# Patient Record
Sex: MALE | Race: WHITE | URBAN - METROPOLITAN AREA
[De-identification: names, ages, dates, MRNs, and addresses within clinical notes are randomized per-mention and may not be internally consistent; named-entity substitution may affect disease eponyms.]

---

## 2019-01-22 ENCOUNTER — OFFICE VISIT (OUTPATIENT)
Dept: PRIMARY CARE CLINIC | Age: 39
End: 2019-01-22
Payer: COMMERCIAL

## 2019-01-22 VITALS
HEART RATE: 103 BPM | HEIGHT: 70 IN | TEMPERATURE: 98.6 F | SYSTOLIC BLOOD PRESSURE: 118 MMHG | WEIGHT: 315 LBS | OXYGEN SATURATION: 98 % | RESPIRATION RATE: 16 BRPM | BODY MASS INDEX: 45.1 KG/M2 | DIASTOLIC BLOOD PRESSURE: 84 MMHG

## 2019-01-22 DIAGNOSIS — D50.8 IRON DEFICIENCY ANEMIA SECONDARY TO INADEQUATE DIETARY IRON INTAKE: ICD-10-CM

## 2019-01-22 DIAGNOSIS — E03.9 HYPOTHYROIDISM (ACQUIRED): ICD-10-CM

## 2019-01-22 DIAGNOSIS — E66.01 MORBID OBESITY WITH BMI OF 50.0-59.9, ADULT (HCC): ICD-10-CM

## 2019-01-22 DIAGNOSIS — E78.5 DYSLIPIDEMIA: ICD-10-CM

## 2019-01-22 DIAGNOSIS — I10 ESSENTIAL HYPERTENSION: ICD-10-CM

## 2019-01-22 DIAGNOSIS — R31.9 HEMATURIA, UNSPECIFIED TYPE: ICD-10-CM

## 2019-01-22 DIAGNOSIS — R35.0 URINARY FREQUENCY: Primary | ICD-10-CM

## 2019-01-22 DIAGNOSIS — R81 GLUCOSE FOUND IN URINE ON EXAMINATION: ICD-10-CM

## 2019-01-22 DIAGNOSIS — R06.02 SOB (SHORTNESS OF BREATH): ICD-10-CM

## 2019-01-22 LAB
BILIRUBIN, POC: ABNORMAL
BLOOD URINE, POC: ABNORMAL
CLARITY, POC: CLEAR
COLOR, POC: YELLOW
GLUCOSE BLD-MCNC: 312 MG/DL
GLUCOSE URINE, POC: ABNORMAL
HBA1C MFR BLD: 9.1 %
KETONES, POC: ABNORMAL
LEUKOCYTE EST, POC: ABNORMAL
NITRITE, POC: ABNORMAL
PH, POC: 6
PROTEIN, POC: ABNORMAL
SPECIFIC GRAVITY, POC: 1.01
UROBILINOGEN, POC: ABNORMAL

## 2019-01-22 PROCEDURE — 99214 OFFICE O/P EST MOD 30 MIN: CPT | Performed by: FAMILY MEDICINE

## 2019-01-22 PROCEDURE — G8417 CALC BMI ABV UP PARAM F/U: HCPCS | Performed by: FAMILY MEDICINE

## 2019-01-22 PROCEDURE — 93000 ELECTROCARDIOGRAM COMPLETE: CPT | Performed by: FAMILY MEDICINE

## 2019-01-22 PROCEDURE — 83036 HEMOGLOBIN GLYCOSYLATED A1C: CPT | Performed by: FAMILY MEDICINE

## 2019-01-22 PROCEDURE — 3046F HEMOGLOBIN A1C LEVEL >9.0%: CPT | Performed by: FAMILY MEDICINE

## 2019-01-22 PROCEDURE — G8427 DOCREV CUR MEDS BY ELIG CLIN: HCPCS | Performed by: FAMILY MEDICINE

## 2019-01-22 PROCEDURE — 1036F TOBACCO NON-USER: CPT | Performed by: FAMILY MEDICINE

## 2019-01-22 PROCEDURE — 82962 GLUCOSE BLOOD TEST: CPT | Performed by: FAMILY MEDICINE

## 2019-01-22 PROCEDURE — G8484 FLU IMMUNIZE NO ADMIN: HCPCS | Performed by: FAMILY MEDICINE

## 2019-01-22 PROCEDURE — 2022F DILAT RTA XM EVC RTNOPTHY: CPT | Performed by: FAMILY MEDICINE

## 2019-01-22 PROCEDURE — 81003 URINALYSIS AUTO W/O SCOPE: CPT | Performed by: FAMILY MEDICINE

## 2019-01-22 RX ORDER — METFORMIN HYDROCHLORIDE 500 MG/1
500 TABLET, EXTENDED RELEASE ORAL
Qty: 60 TABLET | Refills: 2 | Status: SHIPPED | OUTPATIENT
Start: 2019-01-22 | End: 2019-02-04 | Stop reason: SDUPTHER

## 2019-01-22 ASSESSMENT — ENCOUNTER SYMPTOMS
APNEA: 0
SPUTUM PRODUCTION: 0
HEMOPTYSIS: 0
SHORTNESS OF BREATH: 1
ORTHOPNEA: 0
ABDOMINAL PAIN: 0
CONSTIPATION: 0
BLOOD IN STOOL: 0
RHINORRHEA: 0
SORE THROAT: 0
NAUSEA: 0
CHEST TIGHTNESS: 0
SWOLLEN GLANDS: 0
COUGH: 0
WHEEZING: 0
EYES NEGATIVE: 1
BACK PAIN: 0
VOMITING: 0
DIARRHEA: 0
GASTROINTESTINAL NEGATIVE: 1
EYE DISCHARGE: 0
PHOTOPHOBIA: 0

## 2019-01-22 ASSESSMENT — PATIENT HEALTH QUESTIONNAIRE - PHQ9
SUM OF ALL RESPONSES TO PHQ QUESTIONS 1-9: 0
SUM OF ALL RESPONSES TO PHQ QUESTIONS 1-9: 0
1. LITTLE INTEREST OR PLEASURE IN DOING THINGS: 0
SUM OF ALL RESPONSES TO PHQ9 QUESTIONS 1 & 2: 0
2. FEELING DOWN, DEPRESSED OR HOPELESS: 0

## 2019-01-24 LAB
ORGANISM: ABNORMAL
URINE CULTURE, ROUTINE: ABNORMAL
URINE CULTURE, ROUTINE: ABNORMAL

## 2019-01-26 DIAGNOSIS — E78.5 DYSLIPIDEMIA: ICD-10-CM

## 2019-01-26 DIAGNOSIS — E03.9 HYPOTHYROIDISM (ACQUIRED): ICD-10-CM

## 2019-01-26 DIAGNOSIS — D50.8 IRON DEFICIENCY ANEMIA SECONDARY TO INADEQUATE DIETARY IRON INTAKE: ICD-10-CM

## 2019-01-26 LAB
ALBUMIN SERPL-MCNC: 4.1 G/DL (ref 3.9–4.9)
ALP BLD-CCNC: 119 U/L (ref 35–104)
ALT SERPL-CCNC: 119 U/L (ref 0–41)
ANION GAP SERPL CALCULATED.3IONS-SCNC: 16 MEQ/L (ref 7–13)
AST SERPL-CCNC: 106 U/L (ref 0–40)
BASOPHILS ABSOLUTE: 0 K/UL (ref 0–0.2)
BASOPHILS RELATIVE PERCENT: 0.7 %
BILIRUB SERPL-MCNC: 0.7 MG/DL (ref 0–1.2)
BUN BLDV-MCNC: 8 MG/DL (ref 6–20)
CALCIUM SERPL-MCNC: 9.3 MG/DL (ref 8.6–10.2)
CHLORIDE BLD-SCNC: 101 MEQ/L (ref 98–107)
CHOLESTEROL, TOTAL: 205 MG/DL (ref 0–199)
CO2: 23 MEQ/L (ref 22–29)
CREAT SERPL-MCNC: 0.79 MG/DL (ref 0.7–1.2)
EOSINOPHILS ABSOLUTE: 0.1 K/UL (ref 0–0.7)
EOSINOPHILS RELATIVE PERCENT: 1.5 %
GFR AFRICAN AMERICAN: >60
GFR NON-AFRICAN AMERICAN: >60
GLOBULIN: 3.4 G/DL (ref 2.3–3.5)
GLUCOSE BLD-MCNC: 260 MG/DL (ref 74–109)
HCT VFR BLD CALC: 47.5 % (ref 42–52)
HDLC SERPL-MCNC: 22 MG/DL (ref 40–59)
HEMOGLOBIN: 17 G/DL (ref 14–18)
LDL CHOLESTEROL CALCULATED: ABNORMAL MG/DL (ref 0–129)
LYMPHOCYTES ABSOLUTE: 1.7 K/UL (ref 1–4.8)
LYMPHOCYTES RELATIVE PERCENT: 31.9 %
MCH RBC QN AUTO: 30.6 PG (ref 27–31.3)
MCHC RBC AUTO-ENTMCNC: 35.8 % (ref 33–37)
MCV RBC AUTO: 85.6 FL (ref 80–100)
MONOCYTES ABSOLUTE: 0.4 K/UL (ref 0.2–0.8)
MONOCYTES RELATIVE PERCENT: 7.5 %
NEUTROPHILS ABSOLUTE: 3.2 K/UL (ref 1.4–6.5)
NEUTROPHILS RELATIVE PERCENT: 58.4 %
PDW BLD-RTO: 13.9 % (ref 11.5–14.5)
PLATELET # BLD: 165 K/UL (ref 130–400)
POTASSIUM SERPL-SCNC: 4.4 MEQ/L (ref 3.5–5.1)
RBC # BLD: 5.55 M/UL (ref 4.7–6.1)
SODIUM BLD-SCNC: 140 MEQ/L (ref 132–144)
TOTAL PROTEIN: 7.5 G/DL (ref 6.4–8.1)
TRIGL SERPL-MCNC: 1180 MG/DL (ref 0–200)
TSH SERPL DL<=0.05 MIU/L-ACNC: 2.87 UIU/ML (ref 0.27–4.2)
WBC # BLD: 5.5 K/UL (ref 4.8–10.8)

## 2019-01-31 DIAGNOSIS — E11.8 TYPE 2 DIABETES MELLITUS WITH COMPLICATION, WITHOUT LONG-TERM CURRENT USE OF INSULIN (HCC): Primary | ICD-10-CM

## 2019-02-04 ENCOUNTER — HOSPITAL ENCOUNTER (OUTPATIENT)
Dept: DIABETES SERVICES | Age: 39
Setting detail: THERAPIES SERIES
Discharge: HOME | End: 2019-02-04
Payer: COMMERCIAL

## 2019-02-04 ENCOUNTER — OFFICE VISIT (OUTPATIENT)
Dept: PRIMARY CARE CLINIC | Age: 39
End: 2019-02-04
Payer: COMMERCIAL

## 2019-02-04 VITALS
HEART RATE: 80 BPM | DIASTOLIC BLOOD PRESSURE: 80 MMHG | HEIGHT: 70 IN | RESPIRATION RATE: 16 BRPM | OXYGEN SATURATION: 97 % | WEIGHT: 315 LBS | SYSTOLIC BLOOD PRESSURE: 122 MMHG | BODY MASS INDEX: 45.1 KG/M2 | TEMPERATURE: 98.4 F

## 2019-02-04 VITALS — WEIGHT: 315 LBS | BODY MASS INDEX: 45.1 KG/M2 | HEIGHT: 70 IN

## 2019-02-04 DIAGNOSIS — Z80.0 FH: COLON CANCER: ICD-10-CM

## 2019-02-04 DIAGNOSIS — E66.01 MORBID OBESITY WITH BMI OF 50.0-59.9, ADULT (HCC): ICD-10-CM

## 2019-02-04 DIAGNOSIS — E78.1 HYPERTRIGLYCERIDEMIA: ICD-10-CM

## 2019-02-04 PROCEDURE — 3046F HEMOGLOBIN A1C LEVEL >9.0%: CPT | Performed by: FAMILY MEDICINE

## 2019-02-04 PROCEDURE — 1036F TOBACCO NON-USER: CPT | Performed by: FAMILY MEDICINE

## 2019-02-04 PROCEDURE — 99214 OFFICE O/P EST MOD 30 MIN: CPT | Performed by: FAMILY MEDICINE

## 2019-02-04 PROCEDURE — 2022F DILAT RTA XM EVC RTNOPTHY: CPT | Performed by: FAMILY MEDICINE

## 2019-02-04 PROCEDURE — G0108 DIAB MANAGE TRN  PER INDIV: HCPCS

## 2019-02-04 PROCEDURE — G8417 CALC BMI ABV UP PARAM F/U: HCPCS | Performed by: FAMILY MEDICINE

## 2019-02-04 PROCEDURE — G8484 FLU IMMUNIZE NO ADMIN: HCPCS | Performed by: FAMILY MEDICINE

## 2019-02-04 PROCEDURE — G8427 DOCREV CUR MEDS BY ELIG CLIN: HCPCS | Performed by: FAMILY MEDICINE

## 2019-02-04 RX ORDER — ICOSAPENT ETHYL 1000 MG/1
2 CAPSULE ORAL 2 TIMES DAILY
Qty: 120 CAPSULE | Refills: 3 | Status: SHIPPED | OUTPATIENT
Start: 2019-02-04 | End: 2019-03-04

## 2019-02-04 RX ORDER — METFORMIN HYDROCHLORIDE 500 MG/1
500 TABLET, EXTENDED RELEASE ORAL
Qty: 180 TABLET | Refills: 0 | Status: CANCELLED | OUTPATIENT
Start: 2019-02-04 | End: 2019-05-05

## 2019-02-04 RX ORDER — METFORMIN HYDROCHLORIDE 500 MG/1
500 TABLET, EXTENDED RELEASE ORAL
Qty: 180 TABLET | Refills: 1 | Status: SHIPPED | OUTPATIENT
Start: 2019-02-04

## 2019-02-04 ASSESSMENT — ENCOUNTER SYMPTOMS
EYE DISCHARGE: 0
RESPIRATORY NEGATIVE: 1
CONSTIPATION: 0
SHORTNESS OF BREATH: 0
PHOTOPHOBIA: 0
EYES NEGATIVE: 1
ABDOMINAL PAIN: 0
BACK PAIN: 0
BLOOD IN STOOL: 0
VOMITING: 0
COUGH: 0
APNEA: 0
NAUSEA: 0
ABDOMINAL DISTENTION: 1
DIARRHEA: 0
CHEST TIGHTNESS: 0

## 2019-02-04 ASSESSMENT — PROBLEM AREAS IN DIABETES QUESTIONNAIRE (PAID)
COPING WITH COMPLICATIONS OF DIABETES: 0
WORRYING ABOUT THE FUTURE AND THE POSSIBILITY OF SERIOUS COMPLICATIONS: 3
FEELING SCARED WHEN YOU THINK ABOUT LIVING WITH DIABETES: 0
FEELING DEPRESSED WHEN YOU THINK ABOUT LIVING WITH DIABETES: 0
PAID-5 TOTAL SCORE: 3
FEELING THAT DIABETES IS TAKING UP TOO MUCH OF YOUR MENTAL AND PHYSICAL ENERGY EVERY DAY: 0

## 2019-02-04 ASSESSMENT — SLEEP AND FATIGUE QUESTIONNAIRES
HOW DO YOU RATE THE QUALITY OF YOUR SLEEP: FAIR
HAVE YOU BEEN TOLD, OR NOTICED ON YOUR OWN, THAT YOU STOP BREATHING OR STRUGGLE TO BREATHE IN YOUR SLEEP: YES
HAVE YOU EVER BEEN TESTED FOR SLEEP APNEA: YES
HOW MANY HOURS OF SLEEP ARE YOU GETTING, ON AVERAGE: 7 OR MORE

## 2019-02-05 ENCOUNTER — HOSPITAL ENCOUNTER (OUTPATIENT)
Dept: DIABETES SERVICES | Age: 39
Setting detail: THERAPIES SERIES
Discharge: HOME | End: 2019-02-05
Payer: COMMERCIAL

## 2019-02-05 ENCOUNTER — TELEPHONE (OUTPATIENT)
Dept: PRIMARY CARE CLINIC | Age: 39
End: 2019-02-05

## 2019-02-05 PROCEDURE — G0109 DIAB MANAGE TRN IND/GROUP: HCPCS

## 2019-02-05 RX ORDER — OMEGA-3-ACID ETHYL ESTERS 1 G/1
2 CAPSULE, LIQUID FILLED ORAL 2 TIMES DAILY
Qty: 120 CAPSULE | Refills: 5 | Status: SHIPPED | OUTPATIENT
Start: 2019-02-05

## 2019-02-06 ENCOUNTER — HOSPITAL ENCOUNTER (OUTPATIENT)
Dept: DIABETES SERVICES | Age: 39
Setting detail: THERAPIES SERIES
Discharge: HOME | End: 2019-02-06
Payer: COMMERCIAL

## 2019-02-06 PROCEDURE — G0109 DIAB MANAGE TRN IND/GROUP: HCPCS

## 2019-02-07 ENCOUNTER — HOSPITAL ENCOUNTER (OUTPATIENT)
Dept: DIABETES SERVICES | Age: 39
Setting detail: THERAPIES SERIES
Discharge: HOME | End: 2019-02-07
Payer: COMMERCIAL

## 2019-02-07 PROCEDURE — G0109 DIAB MANAGE TRN IND/GROUP: HCPCS

## 2019-02-11 ENCOUNTER — TELEPHONE (OUTPATIENT)
Dept: PRIMARY CARE CLINIC | Age: 39
End: 2019-02-11

## 2019-02-13 DIAGNOSIS — E08.21 DIABETES MELLITUS DUE TO UNDERLYING CONDITION WITH DIABETIC NEPHROPATHY, UNSPECIFIED WHETHER LONG TERM INSULIN USE (HCC): Primary | ICD-10-CM

## 2019-02-13 DIAGNOSIS — E66.01 MORBID OBESITY WITH BMI OF 50.0-59.9, ADULT (HCC): ICD-10-CM

## 2019-03-04 ENCOUNTER — OFFICE VISIT (OUTPATIENT)
Dept: PRIMARY CARE CLINIC | Age: 39
End: 2019-03-04
Payer: COMMERCIAL

## 2019-03-04 VITALS
HEIGHT: 70 IN | DIASTOLIC BLOOD PRESSURE: 82 MMHG | SYSTOLIC BLOOD PRESSURE: 124 MMHG | TEMPERATURE: 97.7 F | OXYGEN SATURATION: 97 % | HEART RATE: 74 BPM | RESPIRATION RATE: 14 BRPM | WEIGHT: 315 LBS | BODY MASS INDEX: 45.1 KG/M2

## 2019-03-04 DIAGNOSIS — I10 ESSENTIAL HYPERTENSION: ICD-10-CM

## 2019-03-04 DIAGNOSIS — E78.5 DYSLIPIDEMIA: ICD-10-CM

## 2019-03-04 DIAGNOSIS — E08.00 DIABETES MELLITUS DUE TO UNDERLYING CONDITION WITH HYPEROSMOLARITY WITHOUT COMA, WITHOUT LONG-TERM CURRENT USE OF INSULIN (HCC): Primary | ICD-10-CM

## 2019-03-04 DIAGNOSIS — R79.89 ABNORMAL LFTS: ICD-10-CM

## 2019-03-04 DIAGNOSIS — M79.605 LEG PAIN, DIFFUSE, LEFT: ICD-10-CM

## 2019-03-04 LAB
GLUCOSE BLD-MCNC: 94 MG/DL
HBA1C MFR BLD: 7.2 %

## 2019-03-04 PROCEDURE — G8417 CALC BMI ABV UP PARAM F/U: HCPCS | Performed by: FAMILY MEDICINE

## 2019-03-04 PROCEDURE — 99214 OFFICE O/P EST MOD 30 MIN: CPT | Performed by: FAMILY MEDICINE

## 2019-03-04 PROCEDURE — G8427 DOCREV CUR MEDS BY ELIG CLIN: HCPCS | Performed by: FAMILY MEDICINE

## 2019-03-04 PROCEDURE — G8484 FLU IMMUNIZE NO ADMIN: HCPCS | Performed by: FAMILY MEDICINE

## 2019-03-04 PROCEDURE — 1036F TOBACCO NON-USER: CPT | Performed by: FAMILY MEDICINE

## 2019-03-04 PROCEDURE — 83036 HEMOGLOBIN GLYCOSYLATED A1C: CPT | Performed by: FAMILY MEDICINE

## 2019-03-04 PROCEDURE — 82962 GLUCOSE BLOOD TEST: CPT | Performed by: FAMILY MEDICINE

## 2019-03-04 RX ORDER — LORATADINE 10 MG/1
10 TABLET ORAL
COMMUNITY
Start: 2018-05-10

## 2019-03-05 DIAGNOSIS — E08.00 DIABETES MELLITUS DUE TO UNDERLYING CONDITION WITH HYPEROSMOLARITY WITHOUT COMA, WITHOUT LONG-TERM CURRENT USE OF INSULIN (HCC): ICD-10-CM

## 2019-03-05 LAB
CREATININE URINE: 163.1 MG/DL
MICROALBUMIN UR-MCNC: 2.3 MG/DL
MICROALBUMIN/CREAT UR-RTO: 14.1 MG/G (ref 0–30)

## 2019-03-17 ASSESSMENT — ENCOUNTER SYMPTOMS
BLOOD IN STOOL: 0
SHORTNESS OF BREATH: 0
EYE DISCHARGE: 0
EYES NEGATIVE: 1
RESPIRATORY NEGATIVE: 1
VOMITING: 0
CHEST TIGHTNESS: 0
NAUSEA: 0
BACK PAIN: 0
GASTROINTESTINAL NEGATIVE: 1
DIARRHEA: 0
COUGH: 0
ABDOMINAL PAIN: 0
APNEA: 0
PHOTOPHOBIA: 0
CONSTIPATION: 0

## 2019-03-23 ENCOUNTER — OFFICE VISIT (OUTPATIENT)
Dept: FAMILY MEDICINE CLINIC | Age: 39
End: 2019-03-23
Payer: COMMERCIAL

## 2019-03-23 VITALS
BODY MASS INDEX: 54.5 KG/M2 | OXYGEN SATURATION: 95 % | WEIGHT: 315 LBS | RESPIRATION RATE: 18 BRPM | HEART RATE: 90 BPM | TEMPERATURE: 98.7 F | DIASTOLIC BLOOD PRESSURE: 70 MMHG | SYSTOLIC BLOOD PRESSURE: 124 MMHG

## 2019-03-23 DIAGNOSIS — H66.002 ACUTE SUPPURATIVE OTITIS MEDIA OF LEFT EAR WITHOUT SPONTANEOUS RUPTURE OF TYMPANIC MEMBRANE, RECURRENCE NOT SPECIFIED: ICD-10-CM

## 2019-03-23 DIAGNOSIS — J45.901 MILD ASTHMA WITH ACUTE EXACERBATION, UNSPECIFIED WHETHER PERSISTENT: Primary | ICD-10-CM

## 2019-03-23 PROCEDURE — 1036F TOBACCO NON-USER: CPT | Performed by: NURSE PRACTITIONER

## 2019-03-23 PROCEDURE — G8427 DOCREV CUR MEDS BY ELIG CLIN: HCPCS | Performed by: NURSE PRACTITIONER

## 2019-03-23 PROCEDURE — G8417 CALC BMI ABV UP PARAM F/U: HCPCS | Performed by: NURSE PRACTITIONER

## 2019-03-23 PROCEDURE — 99214 OFFICE O/P EST MOD 30 MIN: CPT | Performed by: NURSE PRACTITIONER

## 2019-03-23 PROCEDURE — G8484 FLU IMMUNIZE NO ADMIN: HCPCS | Performed by: NURSE PRACTITIONER

## 2019-03-23 RX ORDER — ALBUTEROL SULFATE 90 UG/1
2 AEROSOL, METERED RESPIRATORY (INHALATION) EVERY 4 HOURS PRN
Qty: 1 INHALER | Refills: 3 | Status: SHIPPED | OUTPATIENT
Start: 2019-03-23

## 2019-03-23 RX ORDER — CEFUROXIME AXETIL 500 MG/1
500 TABLET ORAL 2 TIMES DAILY
Qty: 20 TABLET | Refills: 0 | Status: SHIPPED | OUTPATIENT
Start: 2019-03-23 | End: 2019-04-02

## 2019-03-23 ASSESSMENT — ENCOUNTER SYMPTOMS
SHORTNESS OF BREATH: 1
CHEST TIGHTNESS: 0
TROUBLE SWALLOWING: 0
EYE ITCHING: 0
COUGH: 1
SINUS PAIN: 1
EYE PAIN: 0
VOMITING: 0
NAUSEA: 0
EYE DISCHARGE: 0
EYE REDNESS: 0
CONSTIPATION: 0
SORE THROAT: 1
SINUS PRESSURE: 1
WHEEZING: 0
RHINORRHEA: 1
DIARRHEA: 0

## 2019-05-07 ENCOUNTER — TELEPHONE (OUTPATIENT)
Dept: PRIMARY CARE CLINIC | Age: 39
End: 2019-05-07

## 2019-06-07 ENCOUNTER — TELEPHONE (OUTPATIENT)
Dept: DIABETES SERVICES | Age: 39
End: 2019-06-07

## 2019-06-26 ENCOUNTER — TELEPHONE (OUTPATIENT)
Dept: DIABETES SERVICES | Age: 39
End: 2019-06-26

## 2019-08-01 ENCOUNTER — TELEPHONE (OUTPATIENT)
Dept: DIABETES SERVICES | Age: 39
End: 2019-08-01

## 2019-08-01 NOTE — PROGRESS NOTES
carbohydrates. Zhao Cox RN   02/06/2019 Alexi Huerta RD, LD    What to eat - Food groups, When to eat - timing of meals and snacks, and How much to eat - portions control. 1800 calories/ day   4 CHO choices/ meal   14 CHO choices/  day   grams of protein /day   gram of fat /day     Correctly read food labels & demonstrate CHO counting & portion control with personalized meal plan. Identify dining out strategies, & dietary sick day guidelines. (1) 02/04/19 Zhao Cox RN   02/06/2019 Alexi Huerta RD, LD   (3) 08/01/19 Zhao Cox RN  02/04/19  Importance of label reading reviewed with patient including focus on total carbohydrates and not just sugar content as well as serving size. Zhao Cox RN     Instructed on CHO counting, CHO containing foods, & balanced healthy eating  02/06/2019 Alexi Huerta RD, LD       Incorporating physical activity into lifestyle:   Verbalize effect of exercise on blood glucose levels; benefits of regular exercise; safety considerations; contraindications; maintenance of activity. (1) 02/04/19 Zhao Cox RN  02/05/19 Zhao Cox RN   (3) 08/01/19 Zhao Cox RN  02/04/19 Reports being inactive, but would like to start going to the gym soon. Zhao Cox RN     02/05/19 - Reviewed with the group the importance of exercise, recommendations by the ADA, effects on BG, weight loss and precautions. Discussed what patient is doing to stay active with the group. Zhao Cox RN       Using medications safely:  Identify effects of diabetes medicines on blood glucose levels; List diabetes medication taken, action & side effects;    (1) 02/04/19 Zhao Cox RN  02/07/19  Dar Claudio RN    (3) 08/01/19 Zhao Cox RN  02/04/19 Taking metformin for BG. Having some diah in the evening.  Zhao Cox RN   02/07/19 - Discussed all medication classes with group and modes of action including insulin and

## 2020-08-06 ENCOUNTER — NURSE TRIAGE (OUTPATIENT)
Dept: OTHER | Facility: CLINIC | Age: 40
End: 2020-08-06

## 2020-08-06 NOTE — TELEPHONE ENCOUNTER
Reason for Disposition   SEVERE diarrhea (e.g., 7 or more times / day more than normal) and present > 24 hours (1 day)    Answer Assessment - Initial Assessment Questions  1. DIARRHEA SEVERITY: \"How bad is the diarrhea? \" \"How many extra stools have you had in the past 24 hours than normal?\"     - NO DIARRHEA (SCALE 0)    - MILD (SCALE 1-3): Few loose or mushy BMs; increase of 1-3 stools over normal daily number of stools; mild increase in ostomy output. -  MODERATE (SCALE 4-7): Increase of 4-6 stools daily over normal; moderate increase in ostomy output. * SEVERE (SCALE 8-10; OR 'WORST POSSIBLE'): Increase of 7 or more stools daily over normal; moderate increase in ostomy output; incontinence. severe  2. ONSET: \"When did the diarrhea begin? \"       Four days ago  3. BM CONSISTENCY: \"How loose or watery is the diarrhea? \"       watery  4. VOMITING: \"Are you also vomiting? \" If so, ask: \"How many times in the past 24 hours? \"       Yes just on day two and day three  5. ABDOMINAL PAIN: Friddie Berliner you having any abdominal pain? \" If yes: \"What does it feel like? \" (e.g., crampy, dull, intermittent, constant)       Yes before using the bathroom cramping  6. ABDOMINAL PAIN SEVERITY: If present, ask: \"How bad is the pain? \"  (e.g., Scale 1-10; mild, moderate, or severe)    - MILD (1-3): doesn't interfere with normal activities, abdomen soft and not tender to touch     - MODERATE (4-7): interferes with normal activities or awakens from sleep, tender to touch     - SEVERE (8-10): excruciating pain, doubled over, unable to do any normal activities        moderate  7. ORAL INTAKE: If vomiting, \"Have you been able to drink liquids? \" \"How much fluids have you had in the past 24 hours? \"      Yes quite a bit   8. HYDRATION: \"Any signs of dehydration? \" (e.g., dry mouth [not just dry lips], too weak to stand, dizziness, new weight loss) \"When did you last urinate? \"      No  9.  EXPOSURE: \"Have you traveled to a foreign country recently? \" \"Have you been exposed to anyone with diarrhea? \" \"Could you have eaten any food that was spoiled? \"      No   10. ANTIBIOTIC USE: \"Are you taking antibiotics now or have you taken antibiotics in the past 2 months? \"        no  11. OTHER SYMPTOMS: \"Do you have any other symptoms? \" (e.g., fever, blood in stool)        no  12. PREGNANCY: \"Is there any chance you are pregnant? \" \"When was your last menstrual period? \"        n/a    Protocols used: [id number]-[id number]

## 2022-10-31 ENCOUNTER — NURSE TRIAGE (OUTPATIENT)
Dept: OTHER | Facility: CLINIC | Age: 42
End: 2022-10-31

## 2022-10-31 NOTE — TELEPHONE ENCOUNTER
Location of patient: [place]    Subjective: Caller states \"sciatica pain\"     Current Symptoms: pain from lower back that is radiating down left leg. Last 4 days has been the worst.  Spent the weekend laying down with heating pad on back. This morning able to go to work but now back home due to pain  Pain will worsen with walking or standing  A little nausea  Sporadic dizziness. Onset: 2 weeks ago; gradual    Associated Symptoms: NA    Pain Severity: 3-4/10; ; constant    Temperature:      What has been tried: heating pad, ibuprofen    LMP: NA Pregnant: NA    Recommended disposition: See in Office Today    Care advice provided, patient verbalizes understanding; denies any other questions or concerns; instructed to call back for any new or worsening symptoms. Provided caller with info on     This triage is a result of a call to [address] [place] [address]. Please do not respond to the triage nurse through this encounter. Any subsequent communication should be directly with the patient.     Reason for Disposition   Pain radiates into the thigh or further down the leg, and in both legs    Protocols used: Back Pain-ADULT-[place]

## 2023-07-25 ENCOUNTER — TELEPHONE (OUTPATIENT)
Dept: PRIMARY CARE | Facility: CLINIC | Age: 43
End: 2023-07-25
Payer: COMMERCIAL

## 2023-07-25 NOTE — TELEPHONE ENCOUNTER
YANNICK FROM  [facility]. [phone]    CALLED IN REQUESTING A C/B TO THE PT PERTAINING TO PT BLOOD GLUCOSE THAT KEEPS RISING HIGH AND DROPPING LOW FREQUENTLY.

## 2023-08-15 ENCOUNTER — OFFICE VISIT (OUTPATIENT)
Dept: PRIMARY CARE | Facility: CLINIC | Age: 43
End: 2023-08-15
Payer: COMMERCIAL

## 2023-08-15 VITALS
WEIGHT: 315 LBS | RESPIRATION RATE: 15 BRPM | OXYGEN SATURATION: 96 % | DIASTOLIC BLOOD PRESSURE: 80 MMHG | HEIGHT: 70 IN | SYSTOLIC BLOOD PRESSURE: 128 MMHG | BODY MASS INDEX: 45.1 KG/M2 | HEART RATE: 89 BPM

## 2023-08-15 DIAGNOSIS — E78.5 DYSLIPIDEMIA: ICD-10-CM

## 2023-08-15 DIAGNOSIS — R73.9 HYPERGLYCEMIA: ICD-10-CM

## 2023-08-15 DIAGNOSIS — E11.65 TYPE 2 DIABETES MELLITUS WITH HYPERGLYCEMIA, WITHOUT LONG-TERM CURRENT USE OF INSULIN (MULTI): Primary | ICD-10-CM

## 2023-08-15 DIAGNOSIS — E29.1 TESTICULAR HYPOFUNCTION: ICD-10-CM

## 2023-08-15 DIAGNOSIS — E66.01 MORBID OBESITY (MULTI): ICD-10-CM

## 2023-08-15 DIAGNOSIS — E55.9 VITAMIN D DEFICIENCY: ICD-10-CM

## 2023-08-15 PROBLEM — E78.1 HYPERTRIGLYCERIDEMIA: Status: ACTIVE | Noted: 2019-02-04

## 2023-08-15 PROBLEM — N52.9 ERECTILE DYSFUNCTION: Status: ACTIVE | Noted: 2023-08-15

## 2023-08-15 PROBLEM — F32.A DEPRESSION, ACUTE: Status: ACTIVE | Noted: 2023-08-15

## 2023-08-15 PROBLEM — J45.901 MILD ASTHMA WITH ACUTE EXACERBATION (HHS-HCC): Status: ACTIVE | Noted: 2019-03-23

## 2023-08-15 LAB
POC FINGERSTICK BLOOD GLUCOSE: 284 MG/DL (ref 70–100)
POC HEMOGLOBIN A1C: 11.3 % (ref 4.2–6.5)

## 2023-08-15 PROCEDURE — 99214 OFFICE O/P EST MOD 30 MIN: CPT | Performed by: FAMILY MEDICINE

## 2023-08-15 PROCEDURE — 82962 GLUCOSE BLOOD TEST: CPT | Performed by: FAMILY MEDICINE

## 2023-08-15 PROCEDURE — 83036 HEMOGLOBIN GLYCOSYLATED A1C: CPT | Performed by: FAMILY MEDICINE

## 2023-08-15 RX ORDER — METFORMIN HYDROCHLORIDE 500 MG/1
500 TABLET, EXTENDED RELEASE ORAL
COMMUNITY
Start: 2019-02-04 | End: 2023-08-15 | Stop reason: SDUPTHER

## 2023-08-15 RX ORDER — METFORMIN HYDROCHLORIDE 500 MG/1
500 TABLET, EXTENDED RELEASE ORAL
Qty: 90 TABLET | Refills: 2 | Status: SHIPPED | OUTPATIENT
Start: 2023-08-15 | End: 2023-08-31

## 2023-08-15 ASSESSMENT — ENCOUNTER SYMPTOMS
EYE PAIN: 0
DIARRHEA: 0
ADENOPATHY: 0
FATIGUE: 1
APPETITE CHANGE: 0
CONSTIPATION: 0
SINUS PAIN: 0
BLURRED VISION: 0
POLYPHAGIA: 0
MYALGIAS: 0
SORE THROAT: 0
TREMORS: 0
TROUBLE SWALLOWING: 0
SINUS PRESSURE: 0
SEIZURES: 0
NERVOUS/ANXIOUS: 0
FLANK PAIN: 0
CONFUSION: 0
CHEST TIGHTNESS: 0
ABDOMINAL PAIN: 0
DIZZINESS: 0
WEIGHT LOSS: 0
HEADACHES: 1
DYSURIA: 0
ARTHRALGIAS: 0
SHORTNESS OF BREATH: 0
FEVER: 0
ACTIVITY CHANGE: 0
PALPITATIONS: 0
SPEECH DIFFICULTY: 1
HUNGER: 1
DYSPHORIC MOOD: 0
WEAKNESS: 0
RECTAL PAIN: 0
NECK STIFFNESS: 0
DECREASED CONCENTRATION: 0
COUGH: 0
SLEEP DISTURBANCE: 0
ABDOMINAL DISTENTION: 0
VISUAL CHANGE: 1
PHOTOPHOBIA: 0
COLOR CHANGE: 0
HEMATURIA: 0
RHINORRHEA: 0
STRIDOR: 0
AGITATION: 0
BLOOD IN STOOL: 0
BLACKOUTS: 0
SWEATS: 0
POLYDIPSIA: 0

## 2023-08-15 NOTE — PROGRESS NOTES
Subjective   Patient ID: Taryn Ro is a 43 y.o. male who presents for Blood Sugar Problem and Urinary Frequency.    Diabetes  He has type 2 diabetes mellitus. No MedicAlert identification noted. The initial diagnosis of diabetes was made 5 years ago. Hypoglycemia symptoms include headaches, hunger, sleepiness and speech difficulty. Pertinent negatives for hypoglycemia include no confusion, dizziness, mood changes, nervousness/anxiousness, pallor, seizures, sweats or tremors. Associated symptoms include fatigue, polyuria and visual change. Pertinent negatives for diabetes include no blurred vision, no chest pain, no foot paresthesias, no foot ulcerations, no polydipsia, no polyphagia, no weakness and no weight loss. Pertinent negatives for hypoglycemia complications include no blackouts, no hospitalization, no nocturnal hypoglycemia, no required assistance and no required glucagon injection. Symptoms are stable. Diabetic complications include impotence and retinopathy. Pertinent negatives for diabetic complications include no CVA, heart disease, nephropathy, peripheral neuropathy or PVD. Risk factors for coronary artery disease include family history and obesity. Current diabetic treatment includes diet. He is compliant with treatment most of the time. His weight is stable. He is following a diabetic and generally healthy diet. Meal planning includes avoidance of concentrated sweets and carbohydrate counting. He has not had a previous visit with a dietitian. He participates in exercise weekly. He monitors blood glucose at home 3-4 x per week. He monitors urine at home <1 x per month. Blood glucose monitoring compliance is adequate. His home blood glucose trend is increasing rapidly. His breakfast blood glucose is taken between 5-6 am. His breakfast blood glucose range is generally >200 mg/dl. His lunch blood glucose is taken between 12-1 pm. His lunch blood glucose range is generally >200 mg/dl. His dinner blood  glucose is taken between 5-6 pm. His dinner blood glucose range is generally >200 mg/dl. His overall blood glucose range is >200 mg/dl. He sees a podiatrist.Eye exam is not current.        Patient is here because he is having problems with blood glucose. Pt blood glucose are been higher x a month. Pt is monitoring is blood glucose at home and it range 270-350. Pt would like to discuss is medication.    Pt is having frequency urination x 3 months.     Pt states having fatigue and blurring vision on both eyes. Pt is planing to get appointment with his eye doctor.    Review of Systems   Constitutional:  Positive for fatigue. Negative for activity change, appetite change, fever and weight loss.   HENT:  Negative for congestion, dental problem, ear discharge, ear pain, mouth sores, rhinorrhea, sinus pressure, sinus pain, sore throat, tinnitus and trouble swallowing.    Eyes:  Negative for blurred vision, photophobia, pain and visual disturbance.   Respiratory:  Negative for cough, chest tightness, shortness of breath and stridor.    Cardiovascular:  Negative for chest pain and palpitations.   Gastrointestinal:  Negative for abdominal distention, abdominal pain, blood in stool, constipation, diarrhea and rectal pain.   Endocrine: Positive for polyuria. Negative for cold intolerance, heat intolerance, polydipsia and polyphagia.   Genitourinary:  Positive for impotence. Negative for dysuria, flank pain, hematuria and urgency.   Musculoskeletal:  Negative for arthralgias, gait problem, myalgias and neck stiffness.   Skin:  Negative for color change, pallor and rash.   Allergic/Immunologic: Negative for environmental allergies and food allergies.   Neurological:  Positive for speech difficulty and headaches. Negative for dizziness, tremors, seizures, syncope and weakness.   Hematological:  Negative for adenopathy.   Psychiatric/Behavioral:  Negative for agitation, confusion, decreased concentration, dysphoric mood and sleep  "disturbance. The patient is not nervous/anxious.        Objective   /80 (BP Location: Right arm, Patient Position: Sitting, BP Cuff Size: Large adult)   Pulse 89   Resp 15   Ht 1.778 m (5' 10\")   Wt (!) 166 kg (365 lb 6.4 oz)   SpO2 96%   BMI 52.43 kg/m²     Physical Exam  Vitals reviewed.   Constitutional:       General: He is not in acute distress.     Appearance: He is obese. He is not ill-appearing or diaphoretic.   HENT:      Head: Normocephalic.      Right Ear: Tympanic membrane and external ear normal.      Left Ear: Tympanic membrane and external ear normal.      Nose: Nose normal. No congestion.      Mouth/Throat:      Pharynx: No posterior oropharyngeal erythema.   Eyes:      General:         Right eye: No discharge.         Left eye: No discharge.      Extraocular Movements: Extraocular movements intact.      Conjunctiva/sclera: Conjunctivae normal.      Pupils: Pupils are equal, round, and reactive to light.   Cardiovascular:      Rate and Rhythm: Normal rate and regular rhythm.      Pulses: Normal pulses.      Heart sounds: Normal heart sounds. No murmur heard.  Pulmonary:      Effort: Pulmonary effort is normal. No respiratory distress.      Breath sounds: Normal breath sounds. No wheezing or rales.   Chest:      Chest wall: No tenderness.   Abdominal:      General: Abdomen is flat. Bowel sounds are normal. There is distension.      Palpations: There is no mass.      Tenderness: There is no abdominal tenderness. There is no guarding.   Musculoskeletal:         General: No tenderness. Normal range of motion.      Cervical back: Normal range of motion and neck supple. No tenderness.      Right lower leg: No edema.      Left lower leg: No edema.   Skin:     General: Skin is warm and dry.      Coloration: Skin is not jaundiced.      Findings: No bruising or erythema.   Neurological:      General: No focal deficit present.      Mental Status: He is alert and oriented to person, place, and time. " Mental status is at baseline.      Cranial Nerves: No cranial nerve deficit.      Sensory: No sensory deficit.      Coordination: Coordination normal.      Gait: Gait normal.   Psychiatric:         Mood and Affect: Mood normal.         Thought Content: Thought content normal.         Judgment: Judgment normal.         Assessment/Plan   Problem List Items Addressed This Visit       Hyperglycemia    Relevant Orders    POCT Fingerstick Glucose manually resulted (Completed)    POCT glycosylated hemoglobin (Hb A1C) manually resulted (Completed)    Morbid obesity (CMS/[facility])    Type 2 diabetes mellitus with hyperglycemia, without long-term current use of insulin (CMS/[facility]) - Primary    Relevant Medications    empagliflozin (Jardiance) 10 mg    semaglutide (Rybelsus) 3 mg tablet    metFORMIN XR (Glucophage-XR) 500 mg 24 hr tablet    empagliflozin (Jardiance) 10 mg    semaglutide (Rybelsus) 3 mg tablet    Other Relevant Orders    Thyroid Stimulating Hormone    BMI 50.0-59.9, adult (CMS/[facility])     Other Visit Diagnoses       Testicular hypofunction        Relevant Orders    Testosterone    Vitamin D deficiency        Relevant Orders    Vitamin D, Total    Dyslipidemia        Relevant Orders    Comprehensive Metabolic Panel    Lipid Panel    CBC and Auto Differential              Scribe Attestation  By signing my name below, IAlicia RMA , Scribe   attest that this documentation has been prepared under the direction and in the presence of Boo Kurtz DO.   Provider Attestation - Scribe documentation    All medical record entries made by the Scribe were at my direction and personally dictated by me. I have reviewed the chart and agree that the record accurately reflects my personal performance of the history, physical exam, discussion and plan.

## 2023-08-15 NOTE — PATIENT INSTRUCTIONS
Follow up in 3 WEEKS     Continue current medications and therapy for chronic medical conditions.    Patient was advised importance of proper diet/nutrition in addition adequate hydration. Patient was encouraged moderate exercise program to include 30 minutes daily for 5 days of the week or 150 minutes weekly. Patient will follow-up with us as scheduled.    IO A1C AND GLUCOSE TODAY     START JARDIANCE 10MG ONCE DAILY     START RYBELSUS 3MG ONCE DAILY     INCREASE METFORMIN XR 500MG TO 3 TIMES DAILY     OBTAIN FASTING LIPID, CMP, CBC, TESTOSTERONE LEVEL, AND VITAMIN D

## 2023-08-30 DIAGNOSIS — E11.65 TYPE 2 DIABETES MELLITUS WITH HYPERGLYCEMIA, WITHOUT LONG-TERM CURRENT USE OF INSULIN (MULTI): ICD-10-CM

## 2023-08-31 RX ORDER — METFORMIN HYDROCHLORIDE 500 MG/1
500 TABLET, EXTENDED RELEASE ORAL
Qty: 270 TABLET | Refills: 1 | Status: SHIPPED | OUTPATIENT
Start: 2023-08-31 | End: 2024-02-01 | Stop reason: SINTOL

## 2023-09-02 ENCOUNTER — LAB (OUTPATIENT)
Dept: LAB | Facility: LAB | Age: 43
End: 2023-09-02
Payer: COMMERCIAL

## 2023-09-02 DIAGNOSIS — E11.65 TYPE 2 DIABETES MELLITUS WITH HYPERGLYCEMIA, WITHOUT LONG-TERM CURRENT USE OF INSULIN (MULTI): ICD-10-CM

## 2023-09-02 DIAGNOSIS — E55.9 VITAMIN D DEFICIENCY: ICD-10-CM

## 2023-09-02 DIAGNOSIS — E29.1 TESTICULAR HYPOFUNCTION: ICD-10-CM

## 2023-09-02 DIAGNOSIS — E78.5 DYSLIPIDEMIA: ICD-10-CM

## 2023-09-02 LAB
ALANINE AMINOTRANSFERASE (SGPT) (U/L) IN SER/PLAS: 63 U/L (ref 10–52)
ALBUMIN (G/DL) IN SER/PLAS: 4.3 G/DL (ref 3.4–5)
ALKALINE PHOSPHATASE (U/L) IN SER/PLAS: 87 U/L (ref 33–120)
ANION GAP IN SER/PLAS: 13 MMOL/L (ref 10–20)
ASPARTATE AMINOTRANSFERASE (SGOT) (U/L) IN SER/PLAS: 35 U/L (ref 9–39)
BASOPHILS (10*3/UL) IN BLOOD BY AUTOMATED COUNT: 0.02 X10E9/L (ref 0–0.1)
BASOPHILS/100 LEUKOCYTES IN BLOOD BY AUTOMATED COUNT: 0.3 % (ref 0–2)
BILIRUBIN TOTAL (MG/DL) IN SER/PLAS: 0.7 MG/DL (ref 0–1.2)
CALCIDIOL (25 OH VITAMIN D3) (NG/ML) IN SER/PLAS: 13 NG/ML
CALCIUM (MG/DL) IN SER/PLAS: 9.1 MG/DL (ref 8.6–10.3)
CARBON DIOXIDE, TOTAL (MMOL/L) IN SER/PLAS: 25 MMOL/L (ref 21–32)
CHLORIDE (MMOL/L) IN SER/PLAS: 104 MMOL/L (ref 98–107)
CHOLESTEROL (MG/DL) IN SER/PLAS: 188 MG/DL (ref 0–199)
CHOLESTEROL IN HDL (MG/DL) IN SER/PLAS: 29.3 MG/DL
CHOLESTEROL/HDL RATIO: 6.4
CREATININE (MG/DL) IN SER/PLAS: 0.81 MG/DL (ref 0.5–1.3)
EOSINOPHILS (10*3/UL) IN BLOOD BY AUTOMATED COUNT: 0.12 X10E9/L (ref 0–0.7)
EOSINOPHILS/100 LEUKOCYTES IN BLOOD BY AUTOMATED COUNT: 1.8 % (ref 0–6)
ERYTHROCYTE DISTRIBUTION WIDTH (RATIO) BY AUTOMATED COUNT: 13.6 % (ref 11.5–14.5)
ERYTHROCYTE MEAN CORPUSCULAR HEMOGLOBIN CONCENTRATION (G/DL) BY AUTOMATED: 33.8 G/DL (ref 32–36)
ERYTHROCYTE MEAN CORPUSCULAR VOLUME (FL) BY AUTOMATED COUNT: 86 FL (ref 80–100)
ERYTHROCYTES (10*6/UL) IN BLOOD BY AUTOMATED COUNT: 6.03 X10E12/L (ref 4.5–5.9)
GFR MALE: >90 ML/MIN/1.73M2
GLUCOSE (MG/DL) IN SER/PLAS: 133 MG/DL (ref 74–99)
HEMATOCRIT (%) IN BLOOD BY AUTOMATED COUNT: 52.1 % (ref 41–52)
HEMOGLOBIN (G/DL) IN BLOOD: 17.6 G/DL (ref 13.5–17.5)
IMMATURE GRANULOCYTES/100 LEUKOCYTES IN BLOOD BY AUTOMATED COUNT: 0.3 % (ref 0–0.9)
LDL: ABNORMAL MG/DL (ref 0–99)
LEUKOCYTES (10*3/UL) IN BLOOD BY AUTOMATED COUNT: 6.5 X10E9/L (ref 4.4–11.3)
LYMPHOCYTES (10*3/UL) IN BLOOD BY AUTOMATED COUNT: 2.32 X10E9/L (ref 1.2–4.8)
LYMPHOCYTES/100 LEUKOCYTES IN BLOOD BY AUTOMATED COUNT: 35.7 % (ref 13–44)
MONOCYTES (10*3/UL) IN BLOOD BY AUTOMATED COUNT: 0.42 X10E9/L (ref 0.1–1)
MONOCYTES/100 LEUKOCYTES IN BLOOD BY AUTOMATED COUNT: 6.5 % (ref 2–10)
NEUTROPHILS (10*3/UL) IN BLOOD BY AUTOMATED COUNT: 3.59 X10E9/L (ref 1.2–7.7)
NEUTROPHILS/100 LEUKOCYTES IN BLOOD BY AUTOMATED COUNT: 55.4 % (ref 40–80)
PLATELETS (10*3/UL) IN BLOOD AUTOMATED COUNT: 180 X10E9/L (ref 150–450)
POTASSIUM (MMOL/L) IN SER/PLAS: 4.3 MMOL/L (ref 3.5–5.3)
PROTEIN TOTAL: 6.9 G/DL (ref 6.4–8.2)
SODIUM (MMOL/L) IN SER/PLAS: 138 MMOL/L (ref 136–145)
THYROTROPIN (MIU/L) IN SER/PLAS BY DETECTION LIMIT <= 0.05 MIU/L: 2.99 MIU/L (ref 0.44–3.98)
TRIGLYCERIDE (MG/DL) IN SER/PLAS: 562 MG/DL (ref 0–149)
UREA NITROGEN (MG/DL) IN SER/PLAS: 11 MG/DL (ref 6–23)
VLDL: ABNORMAL MG/DL (ref 0–40)

## 2023-09-02 PROCEDURE — 82306 VITAMIN D 25 HYDROXY: CPT

## 2023-09-02 PROCEDURE — 84403 ASSAY OF TOTAL TESTOSTERONE: CPT

## 2023-09-02 PROCEDURE — 83721 ASSAY OF BLOOD LIPOPROTEIN: CPT

## 2023-09-02 PROCEDURE — 80053 COMPREHEN METABOLIC PANEL: CPT

## 2023-09-02 PROCEDURE — 80061 LIPID PANEL: CPT

## 2023-09-02 PROCEDURE — 36415 COLL VENOUS BLD VENIPUNCTURE: CPT

## 2023-09-02 PROCEDURE — 85025 COMPLETE CBC W/AUTO DIFF WBC: CPT

## 2023-09-02 PROCEDURE — 84443 ASSAY THYROID STIM HORMONE: CPT

## 2023-09-03 LAB
CHOLESTEROL IN LDL (MG/DL) IN SER/PLAS BY DIRECT ASSAY: 92 MG/DL (ref 0–129)
TESTOSTERONE (NG/DL) IN SER/PLAS: 326 NG/DL (ref 240–1000)

## 2023-09-05 ENCOUNTER — OFFICE VISIT (OUTPATIENT)
Dept: PRIMARY CARE | Facility: CLINIC | Age: 43
End: 2023-09-05
Payer: COMMERCIAL

## 2023-09-05 VITALS
SYSTOLIC BLOOD PRESSURE: 130 MMHG | DIASTOLIC BLOOD PRESSURE: 90 MMHG | WEIGHT: 315 LBS | HEART RATE: 86 BPM | BODY MASS INDEX: 45.1 KG/M2 | OXYGEN SATURATION: 97 % | HEIGHT: 70 IN

## 2023-09-05 DIAGNOSIS — F32.A DEPRESSION, ACUTE: ICD-10-CM

## 2023-09-05 DIAGNOSIS — E66.01 MORBID OBESITY (MULTI): ICD-10-CM

## 2023-09-05 DIAGNOSIS — D72.820 LYMPHOCYTOSIS: ICD-10-CM

## 2023-09-05 DIAGNOSIS — E11.65 TYPE 2 DIABETES MELLITUS WITH HYPERGLYCEMIA, WITHOUT LONG-TERM CURRENT USE OF INSULIN (MULTI): Primary | ICD-10-CM

## 2023-09-05 DIAGNOSIS — E78.5 DYSLIPIDEMIA: ICD-10-CM

## 2023-09-05 LAB — POC FINGERSTICK BLOOD GLUCOSE: 159 MG/DL (ref 70–100)

## 2023-09-05 PROCEDURE — 82962 GLUCOSE BLOOD TEST: CPT | Performed by: FAMILY MEDICINE

## 2023-09-05 PROCEDURE — 99214 OFFICE O/P EST MOD 30 MIN: CPT | Performed by: FAMILY MEDICINE

## 2023-09-05 RX ORDER — ORAL SEMAGLUTIDE 7 MG/1
7 TABLET ORAL DAILY
Qty: 30 TABLET | Refills: 1 | Status: SHIPPED | OUTPATIENT
Start: 2023-09-05 | End: 2023-09-12

## 2023-09-05 ASSESSMENT — ENCOUNTER SYMPTOMS
TROUBLE SWALLOWING: 0
APPETITE CHANGE: 0
BLOOD IN STOOL: 0
SINUS PRESSURE: 0
SHORTNESS OF BREATH: 0
SLEEP DISTURBANCE: 0
FLANK PAIN: 0
SPEECH DIFFICULTY: 0
MYALGIAS: 0
DYSPHORIC MOOD: 0
PALPITATIONS: 0
SORE THROAT: 0
HEMATURIA: 0
RECTAL PAIN: 0
CONFUSION: 0
COUGH: 0
EYE PAIN: 0
DIARRHEA: 0
COLOR CHANGE: 0
HEADACHES: 0
ABDOMINAL PAIN: 0
DYSURIA: 0
CONSTITUTIONAL NEGATIVE: 1
ARTHRALGIAS: 0
CHEST TIGHTNESS: 0
STRIDOR: 0
NECK STIFFNESS: 0
DECREASED CONCENTRATION: 0
ABDOMINAL DISTENTION: 0
SINUS PAIN: 0
ACTIVITY CHANGE: 0
DIZZINESS: 0
AGITATION: 0
FEVER: 0
NERVOUS/ANXIOUS: 0
ADENOPATHY: 0
FATIGUE: 0
POLYPHAGIA: 0
CONSTIPATION: 0
POLYDIPSIA: 0
SEIZURES: 0
PHOTOPHOBIA: 0
RHINORRHEA: 0

## 2023-09-05 NOTE — PATIENT INSTRUCTIONS
Follow up in 2 months    Continue current medications and therapy for chronic medical conditions.    Patient was advised importance of proper diet/nutrition in addition adequate hydration. Patient was encouraged moderate exercise program to include 30 minutes daily for 5 days of the week or 150 minutes weekly. Patient will follow-up with us as scheduled.    DISCUSS WITH PHARMACY REGARDING INSURANCE APPROVAL OF [employer]

## 2023-09-05 NOTE — PROGRESS NOTES
Subjective   Patient ID: Taryn Ro is a 43 y.o. male who presents for Diabetes.    HPI   Patient presents today following up with diabetes. Patient was started in Jardiance and Rybelsus last visit. Patient also had Metformin increased to 3 times daily. Patient does check his BS at home. Stats that this morning it was 106. Before lunch today it was 136. After lunch BS was 171.     Patient had BW done and would like to review the results today.     Patient would like to discuss alternative for medications (Rybelsus and Jardiance). Patient states medications are costing him $1,000 a month each and he can not afford it.     Review of Systems   Constitutional: Negative.  Negative for activity change, appetite change, fatigue and fever.   HENT:  Negative for congestion, dental problem, ear discharge, ear pain, mouth sores, rhinorrhea, sinus pressure, sinus pain, sore throat, tinnitus and trouble swallowing.    Eyes:  Negative for photophobia, pain and visual disturbance.   Respiratory:  Negative for cough, chest tightness, shortness of breath and stridor.    Cardiovascular:  Negative for chest pain and palpitations.   Gastrointestinal:  Negative for abdominal distention, abdominal pain, blood in stool, constipation, diarrhea and rectal pain.   Endocrine: Negative for cold intolerance, heat intolerance, polydipsia, polyphagia and polyuria.   Genitourinary:  Negative for dysuria, flank pain, hematuria and urgency.   Musculoskeletal:  Negative for arthralgias, gait problem, myalgias and neck stiffness.   Skin:  Negative for color change and rash.   Allergic/Immunologic: Negative for environmental allergies and food allergies.   Neurological:  Negative for dizziness, seizures, syncope, speech difficulty and headaches.   Hematological:  Negative for adenopathy.   Psychiatric/Behavioral:  Negative for agitation, confusion, decreased concentration, dysphoric mood and sleep disturbance. The patient is not nervous/anxious.   "      Objective   /90   Pulse 86   Ht 1.778 m (5' 10\")   Wt (!) 167 kg (368 lb 9.6 oz)   SpO2 97%   BMI 52.89 kg/m²     Physical Exam  Vitals reviewed.   Constitutional:       General: He is not in acute distress.     Appearance: He is obese. He is not ill-appearing or diaphoretic.   HENT:      Head: Normocephalic.      Right Ear: Tympanic membrane and external ear normal.      Left Ear: Tympanic membrane and external ear normal.      Nose: Nose normal. No congestion.      Mouth/Throat:      Pharynx: No posterior oropharyngeal erythema.   Eyes:      General:         Right eye: No discharge.         Left eye: No discharge.      Extraocular Movements: Extraocular movements intact.      Conjunctiva/sclera: Conjunctivae normal.      Pupils: Pupils are equal, round, and reactive to light.   Cardiovascular:      Rate and Rhythm: Normal rate and regular rhythm.      Pulses: Normal pulses.      Heart sounds: Normal heart sounds. No murmur heard.  Pulmonary:      Effort: Pulmonary effort is normal. No respiratory distress.      Breath sounds: Normal breath sounds. No wheezing or rales.   Chest:      Chest wall: No tenderness.   Abdominal:      General: Bowel sounds are normal. There is distension.      Palpations: There is no mass.      Tenderness: There is no abdominal tenderness. There is no guarding.   Musculoskeletal:         General: No tenderness. Normal range of motion.      Cervical back: Normal range of motion and neck supple. No tenderness.      Right lower leg: No edema.      Left lower leg: No edema.   Skin:     General: Skin is warm and dry.      Coloration: Skin is not jaundiced.      Findings: No bruising or erythema.   Neurological:      General: No focal deficit present.      Mental Status: He is alert and oriented to person, place, and time. Mental status is at baseline.      Cranial Nerves: No cranial nerve deficit.      Sensory: No sensory deficit.      Coordination: Coordination normal.      " Gait: Gait normal.   Psychiatric:         Mood and Affect: Mood normal.         Thought Content: Thought content normal.         Judgment: Judgment normal.         Assessment/Plan   Problem List Items Addressed This Visit       Depression, acute    Morbid obesity (CMS/[facility])    Type 2 diabetes mellitus with hyperglycemia, without long-term current use of insulin (CMS/[facility]) - Primary    Relevant Medications    empagliflozin (Jardiance) 10 mg    semaglutide (Rybelsus) 7 mg tablet    Other Relevant Orders    POCT Fingerstick Glucose manually resulted (Completed)    BMI 50.0-59.9, adult (CMS/[facility])     Other Visit Diagnoses       Dyslipidemia        Relevant Orders    Lipid Panel    Comprehensive Metabolic Panel    Lymphocytosis        Relevant Orders    CBC              Scribe Attestation  By signing my name below, I, BEAN Torres , Cheri   attest that this documentation has been prepared under the direction and in the presence of Boo Kurtz DO.   Provider Attestation - Scribe documentation    All medical record entries made by the Scribe were at my direction and personally dictated by me. I have reviewed the chart and agree that the record accurately reflects my personal performance of the history, physical exam, discussion and plan.

## 2023-09-06 ENCOUNTER — TELEPHONE (OUTPATIENT)
Dept: PHARMACY | Facility: HOSPITAL | Age: 43
End: 2023-09-06
Payer: COMMERCIAL

## 2023-09-06 DIAGNOSIS — E11.65 TYPE 2 DIABETES MELLITUS WITH HYPERGLYCEMIA, WITHOUT LONG-TERM CURRENT USE OF INSULIN (MULTI): Primary | ICD-10-CM

## 2023-09-06 NOTE — TELEPHONE ENCOUNTER
Taryn Ro is a 43 year old male who was referred to the Clinical Pharmacy Team by Dr. Boo Kurtz for help with cost on his diabetes medications. The patient has a $3000 deductible with his insurance, that he is required to hit before the Jardiance and Rybelsus is brought down to a lower copay. Currently, the entire cost of the medication is going towards the patients deductible. The Rybelsus cost $891/month, and the Jardiance costs $565.26/month. Patient has about $2500 left towards his deductible.     Clinical Pharmacist spoke with the patient today regarding his insurance coverage. Patient states this is not new insurance, but the Jardiance and Rybelsus are new medications. Patient states he was holding off on some of his medical needs due to his daughter requiring medical attention that he was saving money for. Patient also states that when he was first diagnosed with diabetes, he believes he was taking Jardiance but stopped due to the cost. Patient states at that time he was able to control his diabetes with diet, but is unable to do that now.    Clinical Pharmacist discussed the addition of Mounjaro for the patient. Patient would discontinue Rybelsus, and start Mounjaro 2.5mg once weekly, and titrate as tolerated. The medication is about $950, which would go towards his deductible, but there is a  coupon available through [facility] that brings the medication down to $25. Patient states that he is not excited about an injectable medication, but would consider it if it is the only affordable option.    Patient was busy at work and would like the Clinical Pharmacist to call back to discuss the medication more and all his options for diabetes.     Follow up with the Clinical Pharmacy Team on 11/12/2023 at 4:30PM to discuss Mounjaro initiation and diabetes management.     Continue all meds under the continuation of care with the referring provider and clinical pharmacy team.    Please  reach out to the Clinical Pharmacy Team if there are any further questions.     Verbal consent to manage patient's drug therapy was obtained from patient. They were informed they may decline to participate or withdraw from participation in pharmacy services at any time.    Maryann Bryant, PharmD  [phone]

## 2023-09-12 ENCOUNTER — TELEMEDICINE (OUTPATIENT)
Dept: PHARMACY | Facility: HOSPITAL | Age: 43
End: 2023-09-12
Payer: COMMERCIAL

## 2023-09-12 DIAGNOSIS — E11.65 TYPE 2 DIABETES MELLITUS WITH HYPERGLYCEMIA, WITHOUT LONG-TERM CURRENT USE OF INSULIN (MULTI): ICD-10-CM

## 2023-09-12 RX ORDER — TIRZEPATIDE 2.5 MG/.5ML
2.5 INJECTION, SOLUTION SUBCUTANEOUS
Qty: 2 ML | Refills: 1 | Status: SHIPPED | OUTPATIENT
Start: 2023-09-12 | End: 2023-10-11 | Stop reason: ALTCHOICE

## 2023-09-12 NOTE — PROGRESS NOTES
Subjective   Patient ID: Taryn Ro is a 43 y.o. male who presents for Diabetes.    Referring Provider: Boo Kurtz DO     Diabetes  He presents for his initial diabetic visit. He has type 2 diabetes mellitus. His disease course has been worsening. There are no hypoglycemic associated symptoms. There are no hypoglycemic complications. Current diabetic treatments: Metformin XR 500mg 1 tablet 3 times a day, Jardiance 10mg daily, Rybelsus 3mg daily. He is compliant with treatment all of the time (Patient states he was not completely compliant to his medications, however 3-6 months ago his blood sugars were consistantly high, and since then he has been compliant.). Diabetic current diet: Patient states he is working towards eating smaller portions. (Patient checks his blood sugar with a finger prick. Patient was in the car during the appointment and did not have his monitor in front of him for the appointment. Patient states that his blood sugars ranged from about 200-250mg/dL, and when he was off medication, got up to 350mg/dL.) An ACE inhibitor/angiotensin II receptor blocker is not being taken.     Objective     Labs  Lab Results   Component Value Date    BILITOT 0.7 09/02/2023    CALCIUM 9.1 09/02/2023    CO2 25 09/02/2023     09/02/2023    CREATININE 0.81 09/02/2023    GLUCOSE 133 (H) 09/02/2023    ALKPHOS 87 09/02/2023    K 4.3 09/02/2023    PROT 6.9 09/02/2023     09/02/2023    AST 35 09/02/2023    ALT 63 (H) 09/02/2023    BUN 11 09/02/2023    ANIONGAP 13 09/02/2023    ALBUMIN 4.3 09/02/2023    LIPASE 61 09/20/2022    GFRMALE >90 09/02/2023     Lab Results   Component Value Date    TRIG 562 (H) 09/02/2023    CHOL 188 09/02/2023    HDL 29.3 (A) 09/02/2023     Lab Results   Component Value Date    HGBA1C 11.3 (A) 08/15/2023     Current Outpatient Medications on File Prior to Visit   Medication Sig Dispense Refill    empagliflozin (Jardiance) 10 mg Take 1 tablet (10 mg) by mouth once daily. 28  tablet 0    metFORMIN  mg 24 hr tablet TAKE 1 TABLET BY MOUTH 3 TIMES A DAY WITH MEALS. 270 tablet 1    semaglutide (Rybelsus) 3 mg tablet Take 1 tablet (3 mg) by mouth once daily. 30 tablet 0    [DISCONTINUED] empagliflozin (Jardiance) 10 mg Take 1 tablet (10 mg) by mouth once daily. 30 tablet 1    [DISCONTINUED] semaglutide (Rybelsus) 3 mg tablet Take 1 tablet (3 mg) by mouth once daily. 30 tablet 2    [DISCONTINUED] semaglutide (Rybelsus) 7 mg tablet Take 1 tablet (7 mg) by mouth once daily. 30 tablet 1     No current facility-administered medications on file prior to visit.      Assessment/Plan   Diabetes Education  Rule of 15: eating ~15 g of carbs when BG less than 80 (half cup juice, 3-4 glucose tabs).  Recognize symptoms of high and low blood sugar.   Eat a realistic healthy diet consisting of fruits, vegetables, fiber, protein food choices on a regular basis and be aware of portion/serving sizes. Reduce carbohydrate consumption and always consume with protein and fat. Avoid foods high in saturated/trans fat, high salt content, and sweets and beverages with added sugars.  Limit alcohol consumption; alcohol may affect your blood sugar and cause hypoglycemia.   Stay active and incorporate ~30 mins of exercise into your daily routine to manage your weight and increase the body's acceptance of insulin.    PATIENT GOALS   Fasting B - 130 mg/dL 2-HR Postprandial BG:   Less than 180 mg/dL A1c:   Less than 7.0 %     Mounjaro Education:   Counseled patient on MOA, expectations, side effects, duration of therapy, contraindications, administration, and monitoring parameters  Answered all patient questions and concerns  Counseled patient on Mounjaro MOA, expectations, side effects, duration of therapy, administration, and monitoring parameters.  Provided detailed dosing and administration counseling to ensure proper technique.   Reviewed Mounjaro titration schedule, starting with 2.5 mg once weekly to a  goal of 15 mg once weekly if tolerated  Counseled patient on the benefits of GLP-1ra glycemic control and weight loss  Reviewed storage requirements of Mounjaro when not in use, and when to administer the medication if a dose is missed.  Advised patient that they may experience improved satiety after meals and portion sizes of meals may be reduced as doses of Mounjaro increase.    Patient Goals  Patient states that he has been trying to eat smaller portions, more often, which has allowed him to help lose weight.  Patient would like to not be on insulin for treatment of diabetes. Discussed that Mounjaro is not insulin, and clinically is a very good option to reduce A1c and help with weight loss.    Problem List Items Addressed This Visit       Type 2 diabetes mellitus with hyperglycemia, without long-term current use of insulin (CMS/[facility])     RX CHANGES  START Mounjaro 2.5mg/0.5mL Inject 2.5mg (1 pen) under the skin once weekly.  DISCONTINUE Rybelsus 3mg on Tuesday 9/19/2023. Patient will start taking Mounjaro on 9/23/2023 and knows not to take Rybelsus with Mounjaro.  CONTINUE Jardiance 10mg daily and Metformin XR 500mg 1 tablet 3 times a day.  Continue checking blood sugars. Patient states he is able to share the data with the Clinical Pharmacist. He will look into how to share the information and will bring that information to the next appointment.  Continue working on healthy diet and lifestyle.         Relevant Medications    tirzepatide (Mounjaro) 2.5 mg/0.5 mL pen injector    Other Relevant Orders    Follow Up In [facility] Pharmacy     Follow up with the Clinical Pharmacy Team on 10/10/2023 at 4:30PM to discuss Mounjaro Titration and blood sugars. Provided patient with the Clinical Pharmacist phone number for any additional questions.    Continue all meds under the continuation of care with the referring provider and clinical pharmacy team.    Please reach out to the Clinical Pharmacy Team if  there are any further questions.     Verbal consent to manage patient's drug therapy was obtained from patient. They were informed they may decline to participate or withdraw from participation in pharmacy services at any time.    Maryann Bryant, PharmD  [phone]

## 2023-09-12 NOTE — ASSESSMENT & PLAN NOTE
RX CHANGES  START Mounjaro 2.5mg/0.5mL Inject 2.5mg (1 pen) under the skin once weekly.  DISCONTINUE Rybelsus 3mg on Tuesday 9/19/2023. Patient will start taking Mounjaro on 9/23/2023 and knows not to take Rybelsus with Mounjaro.  CONTINUE Jardiance 10mg daily and Metformin XR 500mg 1 tablet 3 times a day.  Continue checking blood sugars. Patient states he is able to share the data with the Clinical Pharmacist. He will look into how to share the information and will bring that information to the next appointment.  Continue working on healthy diet and lifestyle.

## 2023-10-10 ENCOUNTER — APPOINTMENT (OUTPATIENT)
Dept: PHARMACY | Facility: HOSPITAL | Age: 43
End: 2023-10-10
Payer: COMMERCIAL

## 2023-10-11 ENCOUNTER — TELEMEDICINE (OUTPATIENT)
Dept: PHARMACY | Facility: HOSPITAL | Age: 43
End: 2023-10-11
Payer: COMMERCIAL

## 2023-10-11 ENCOUNTER — PHARMACY VISIT (OUTPATIENT)
Dept: PHARMACY | Facility: CLINIC | Age: 43
End: 2023-10-11
Payer: COMMERCIAL

## 2023-10-11 DIAGNOSIS — E11.65 TYPE 2 DIABETES MELLITUS WITH HYPERGLYCEMIA, WITHOUT LONG-TERM CURRENT USE OF INSULIN (MULTI): ICD-10-CM

## 2023-10-11 RX ORDER — TIRZEPATIDE 5 MG/.5ML
5 INJECTION, SOLUTION SUBCUTANEOUS
Qty: 2 ML | Refills: 1 | Status: SHIPPED | OUTPATIENT
Start: 2023-10-11 | End: 2023-10-20 | Stop reason: SDUPTHER

## 2023-10-11 NOTE — ASSESSMENT & PLAN NOTE
Patients diabetes is uncontrolled as evidenced by his most recent A1c of 11.3% on 8/15/2023. Patient states he was not being compliant with medications, but with the high A1c he is motivated and ready to get his A1c back down.  INCREASE Mounjaro to 5mg after completing the last dose of 2.5mg on 10/14/2023. Will send Mounjaro 5mg to [facility] for mail order. Patient states since discontinuing the Rybelsus and starting the Mounjaro, he has had some digestive issues and diarrhea, but is willing to increase to the 5mg  Patient was not taking the injection on a consistent day, but will now consistently be taking it on Saturdays. Discussed the importance of consistency with the injection. Patient understoon.  Patient is using the  copay card to help pay for the Mounjaro. Patient has a high deductible medication plan, which is causing high copays for his medications. The next dose of Mounjaro will be about $132, and then he will have about $450 left of his deductible. Patient is aware and [facility] will let patient know the cost before the medications are sent.   Continue taking blood sugars 1-2 times daily.  Continue working towards healthy diet and lifestyle.

## 2023-10-11 NOTE — PROGRESS NOTES
Subjective   Patient ID: Taryn Ro is a 43 y.o. male who presents for Diabetes.    Referring Provider: Boo Kurtz DO     Diabetes  He presents for his follow-up diabetic visit. He has type 2 diabetes mellitus. His disease course has been worsening. There are no hypoglycemic associated symptoms. There are no hypoglycemic complications. Current diabetic treatments: Metformin XR 500mg 1 tablet 3 times a day, Jardiance 10mg daily, Mounjaro 2.5mg under the skin once weekly. He is compliant with treatment all of the time (Patient states he was not completely compliant to his medications, however 3-6 months ago his blood sugars were consistantly high, and since then he has been compliant.). Diabetic current diet: Patient states he is working towards eating smaller portions. (Patient states when he has been testing his blood sugars since starting Mounjaro they have ranged from 95-130mg/dL.) An ACE inhibitor/angiotensin II receptor blocker is not being taken.     Objective     Labs  Lab Results   Component Value Date    BILITOT 0.7 09/02/2023    CALCIUM 9.1 09/02/2023    CO2 25 09/02/2023     09/02/2023    CREATININE 0.81 09/02/2023    GLUCOSE 133 (H) 09/02/2023    ALKPHOS 87 09/02/2023    K 4.3 09/02/2023    PROT 6.9 09/02/2023     09/02/2023    AST 35 09/02/2023    ALT 63 (H) 09/02/2023    BUN 11 09/02/2023    ANIONGAP 13 09/02/2023    ALBUMIN 4.3 09/02/2023    LIPASE 61 09/20/2022    GFRMALE >90 09/02/2023     Lab Results   Component Value Date    TRIG 562 (H) 09/02/2023    CHOL 188 09/02/2023    HDL 29.3 (A) 09/02/2023     Lab Results   Component Value Date    HGBA1C 11.3 (A) 08/15/2023     Current Outpatient Medications on File Prior to Visit   Medication Sig Dispense Refill    empagliflozin (Jardiance) 10 mg Take 1 tablet (10 mg) by mouth once daily. 28 tablet 0    metFORMIN  mg 24 hr tablet TAKE 1 TABLET BY MOUTH 3 TIMES A DAY WITH MEALS. 270 tablet 1    tirzepatide (Mounjaro) 2.5 mg/0.5  mL pen injector Inject 2.5 mg under the skin every 7 days. 2 mL 1    [DISCONTINUED] semaglutide (Rybelsus) 3 mg tablet Take 1 tablet (3 mg) by mouth once daily. 30 tablet 0    [DISCONTINUED] tirzepatide 2.5 mg/0.5 mL pen injector INJECT 1 PEN (2.5 MG) UNDER THE SKIN ONCE WEEKLY 2 mL 1     No current facility-administered medications on file prior to visit.      Assessment/Plan   Diabetes Education  Rule of 15: eating ~15 g of carbs when BG less than 80 (half cup juice, 3-4 glucose tabs).  Recognize symptoms of high and low blood sugar.   Eat a realistic healthy diet consisting of fruits, vegetables, fiber, protein food choices on a regular basis and be aware of portion/serving sizes. Reduce carbohydrate consumption and always consume with protein and fat. Avoid foods high in saturated/trans fat, high salt content, and sweets and beverages with added sugars.  Limit alcohol consumption; alcohol may affect your blood sugar and cause hypoglycemia.   Stay active and incorporate ~30 mins of exercise into your daily routine to manage your weight and increase the body's acceptance of insulin.    PATIENT GOALS   Fasting B - 130 mg/dL 2-HR Postprandial BG:   Less than 180 mg/dL A1c:   Less than 7.0 %     Mounjaro Education:   Counseled patient on MOA, expectations, side effects, duration of therapy, contraindications, administration, and monitoring parameters  Answered all patient questions and concerns  Counseled patient on Mounjaro MOA, expectations, side effects, duration of therapy, administration, and monitoring parameters.  Provided detailed dosing and administration counseling to ensure proper technique.   Reviewed Mounjaro titration schedule, starting with 2.5 mg once weekly to a goal of 15 mg once weekly if tolerated  Counseled patient on the benefits of GLP-1ra glycemic control and weight loss  Reviewed storage requirements of Mounjaro when not in use, and when to administer the medication if a dose is  missed.  Advised patient that they may experience improved satiety after meals and portion sizes of meals may be reduced as doses of Mounjaro increase.    Patient Goals  Patient states that he has been trying to eat smaller portions, more often, which has allowed him to help lose weight.  Patient would like to not be on insulin for treatment of diabetes. Discussed that Mounjaro is not insulin, and clinically is a very good option to reduce A1c and help with weight loss.    Problem List Items Addressed This Visit       Type 2 diabetes mellitus with hyperglycemia, without long-term current use of insulin (CMS/[facility])     Patients diabetes is uncontrolled as evidenced by his most recent A1c of 11.3% on 8/15/2023. Patient states he was not being compliant with medications, but with the high A1c he is motivated and ready to get his A1c back down.  INCREASE Mounjaro to 5mg after completing the last dose of 2.5mg on 10/14/2023. Will send Mounjaro 5mg to [facility] for mail order. Patient states since discontinuing the Rybelsus and starting the Mounjaro, he has had some digestive issues and diarrhea, but is willing to increase to the 5mg  Patient was not taking the injection on a consistent day, but will now consistently be taking it on Saturdays. Discussed the importance of consistency with the injection. Patient understoon.  Patient is using the  copay card to help pay for the Mounjaro. Patient has a high deductible medication plan, which is causing high copays for his medications. The next dose of Mounjaro will be about $132, and then he will have about $450 left of his deductible. Patient is aware and [facility] will let patient know the cost before the medications are sent.   Continue taking blood sugars 1-2 times daily.  Continue working towards healthy diet and lifestyle.          Follow up with the Clinical Pharmacy Team on 11/6/2023 at 4:00PM to discuss Mounjaro Titration and blood sugars. Provided  patient with the Clinical Pharmacist phone number for any additional questions.    Continue all meds under the continuation of care with the referring provider and clinical pharmacy team.    Please reach out to the Clinical Pharmacy Team if there are any further questions.     Verbal consent to manage patient's drug therapy was obtained from patient. They were informed they may decline to participate or withdraw from participation in pharmacy services at any time.    Maryann Bryant, PharmD  [phone]

## 2023-10-12 ENCOUNTER — APPOINTMENT (OUTPATIENT)
Dept: PHARMACY | Facility: HOSPITAL | Age: 43
End: 2023-10-12
Payer: COMMERCIAL

## 2023-10-20 ENCOUNTER — PHARMACY VISIT (OUTPATIENT)
Dept: PHARMACY | Facility: CLINIC | Age: 43
End: 2023-10-20
Payer: COMMERCIAL

## 2023-10-20 ENCOUNTER — TELEPHONE (OUTPATIENT)
Dept: PHARMACY | Facility: HOSPITAL | Age: 43
End: 2023-10-20
Payer: COMMERCIAL

## 2023-10-20 DIAGNOSIS — E11.65 TYPE 2 DIABETES MELLITUS WITH HYPERGLYCEMIA, WITHOUT LONG-TERM CURRENT USE OF INSULIN (MULTI): ICD-10-CM

## 2023-10-20 PROCEDURE — RXMED WILLOW AMBULATORY MEDICATION CHARGE

## 2023-10-20 RX ORDER — TIRZEPATIDE 5 MG/.5ML
5 INJECTION, SOLUTION SUBCUTANEOUS
Qty: 2 ML | Refills: 1 | Status: SHIPPED | OUTPATIENT
Start: 2023-10-20 | End: 2023-11-06 | Stop reason: ALTCHOICE

## 2023-10-20 NOTE — TELEPHONE ENCOUNTER
Taryn Ro is a 43 year old male who was referred to the Clinical Pharmacy Team by Dr. Boo Kurtz for diabetes management. Prescription was sent to [facility] for mail order, however patient will now  at [facility]. Prescription sent to [facility] to be picked up. In the future, patient would like fills going to [facility] for mail order.    Continue all meds under the continuation of care with the referring provider and clinical pharmacy team.    Please reach out to the Clinical Pharmacy Team if there are any further questions.     Verbal consent to manage patient's drug therapy was obtained from patient. They were informed they may decline to participate or withdraw from participation in pharmacy services at any time.    Maryann Bryant, PharmD  [phone]

## 2023-10-30 ENCOUNTER — OFFICE VISIT (OUTPATIENT)
Dept: PRIMARY CARE | Facility: CLINIC | Age: 43
End: 2023-10-30
Payer: COMMERCIAL

## 2023-10-30 VITALS
SYSTOLIC BLOOD PRESSURE: 126 MMHG | BODY MASS INDEX: 45.1 KG/M2 | HEART RATE: 83 BPM | OXYGEN SATURATION: 98 % | RESPIRATION RATE: 15 BRPM | HEIGHT: 70 IN | WEIGHT: 315 LBS | DIASTOLIC BLOOD PRESSURE: 90 MMHG

## 2023-10-30 DIAGNOSIS — Z23 NEED FOR INFLUENZA VACCINATION: ICD-10-CM

## 2023-10-30 DIAGNOSIS — E11.42 TYPE 2 DIABETES MELLITUS WITH POLYNEUROPATHY (MULTI): Primary | ICD-10-CM

## 2023-10-30 DIAGNOSIS — F32.A DEPRESSION, ACUTE: ICD-10-CM

## 2023-10-30 DIAGNOSIS — E66.01 MORBID OBESITY (MULTI): ICD-10-CM

## 2023-10-30 PROCEDURE — 99214 OFFICE O/P EST MOD 30 MIN: CPT | Performed by: FAMILY MEDICINE

## 2023-10-30 PROCEDURE — 90471 IMMUNIZATION ADMIN: CPT | Performed by: FAMILY MEDICINE

## 2023-10-30 PROCEDURE — 90686 IIV4 VACC NO PRSV 0.5 ML IM: CPT | Performed by: FAMILY MEDICINE

## 2023-10-30 ASSESSMENT — ENCOUNTER SYMPTOMS: DIABETIC ASSOCIATED SYMPTOMS: 0

## 2023-10-30 NOTE — PROGRESS NOTES
Subjective   Patient ID: Taryn Ro is a 43 y.o. male who presents for Diabetes.    Patient is here for follow up on diabetes.    Patient states feel better and the glucose stay more down. Patient would like discuss the dosage of metformin.     Patient denies have any symptoms or concerns today.    Diabetes  He presents for his follow-up diabetic visit. He has type 2 diabetes mellitus. There are no hypoglycemic associated symptoms. Pertinent negatives for hypoglycemia include no confusion, dizziness, headaches, nervousness/anxiousness, seizures or speech difficulty. There are no diabetic associated symptoms. Pertinent negatives for diabetes include no chest pain, no fatigue, no polydipsia, no polyphagia and no polyuria. There are no hypoglycemic complications. There are no diabetic complications. There are no known risk factors for coronary artery disease.        Review of Systems   Constitutional: Negative.  Negative for activity change, appetite change, fatigue and fever.   HENT:  Negative for congestion, dental problem, ear discharge, ear pain, mouth sores, rhinorrhea, sinus pressure, sinus pain, sore throat, tinnitus and trouble swallowing.    Eyes:  Negative for photophobia, pain and visual disturbance.   Respiratory:  Negative for cough, chest tightness, shortness of breath and stridor.    Cardiovascular:  Negative for chest pain and palpitations.   Gastrointestinal:  Negative for abdominal distention, abdominal pain, blood in stool, constipation, diarrhea and rectal pain.   Endocrine: Negative for cold intolerance, heat intolerance, polydipsia, polyphagia and polyuria.   Genitourinary:  Negative for dysuria, flank pain, hematuria and urgency.   Musculoskeletal:  Negative for arthralgias, gait problem, myalgias and neck stiffness.   Skin:  Negative for color change and rash.   Allergic/Immunologic: Negative for environmental allergies and food allergies.   Neurological:  Negative for dizziness, seizures,  "syncope, speech difficulty and headaches.   Hematological:  Negative for adenopathy.   Psychiatric/Behavioral:  Negative for agitation, confusion, decreased concentration, dysphoric mood and sleep disturbance. The patient is not nervous/anxious.        Objective   /90 (BP Location: Right arm, Patient Position: Sitting, BP Cuff Size: Large adult)   Pulse 83   Resp 15   Ht 1.778 m (5' 10\")   Wt (!) 166 kg (365 lb 12.8 oz)   SpO2 98%   BMI 52.49 kg/m²     Physical Exam  Vitals reviewed.   Constitutional:       General: He is not in acute distress.     Appearance: Normal appearance. He is normal weight. He is not ill-appearing or diaphoretic.   HENT:      Head: Normocephalic.      Right Ear: Tympanic membrane and external ear normal.      Left Ear: Tympanic membrane and external ear normal.      Nose: Nose normal. No congestion.      Mouth/Throat:      Pharynx: No posterior oropharyngeal erythema.   Eyes:      General:         Right eye: No discharge.         Left eye: No discharge.      Extraocular Movements: Extraocular movements intact.      Conjunctiva/sclera: Conjunctivae normal.      Pupils: Pupils are equal, round, and reactive to light.   Cardiovascular:      Rate and Rhythm: Normal rate and regular rhythm.      Pulses: Normal pulses.      Heart sounds: Normal heart sounds. No murmur heard.  Pulmonary:      Effort: Pulmonary effort is normal. No respiratory distress.      Breath sounds: Normal breath sounds. No wheezing or rales.   Chest:      Chest wall: No tenderness.   Abdominal:      General: Abdomen is flat. Bowel sounds are normal. There is no distension.      Palpations: There is no mass.      Tenderness: There is no abdominal tenderness. There is no guarding.   Musculoskeletal:         General: No tenderness. Normal range of motion.      Cervical back: Normal range of motion and neck supple. No tenderness.      Right lower leg: No edema.      Left lower leg: No edema.   Skin:     General: " Skin is dry.      Coloration: Skin is not jaundiced.      Findings: No bruising, erythema or rash.   Neurological:      General: No focal deficit present.      Mental Status: He is alert and oriented to person, place, and time. Mental status is at baseline.      Cranial Nerves: No cranial nerve deficit.      Sensory: No sensory deficit.      Coordination: Coordination normal.      Gait: Gait normal.   Psychiatric:         Mood and Affect: Mood normal.         Thought Content: Thought content normal.         Judgment: Judgment normal.         Assessment/Plan   Problem List Items Addressed This Visit             ICD-10-CM    Depression, acute F32.A    Morbid obesity (CMS/[facility]) E66.01    Type 2 diabetes mellitus with polyneuropathy (CMS/[facility]) - Primary E11.42     Stable/improving         BMI 50.0-59.9, adult (CMS/[facility]) Z68.43     Other Visit Diagnoses         Codes    Need for influenza vaccination     Z23    Relevant Orders    Flu vaccine (IIV4) age 6 months and greater, preservative free (Completed)

## 2023-10-31 PROBLEM — E11.42 TYPE 2 DIABETES MELLITUS WITH POLYNEUROPATHY (MULTI): Status: ACTIVE | Noted: 2023-08-15

## 2023-10-31 NOTE — ASSESSMENT & PLAN NOTE
Stable/improving   What Is The Reason For Today's Visit?: Skin Cancer Screening Family Member: Daughter

## 2023-11-03 PROBLEM — J06.9 URI (UPPER RESPIRATORY INFECTION): Status: ACTIVE | Noted: 2023-11-03

## 2023-11-03 PROBLEM — M54.40 LOW BACK PAIN WITH SCIATICA: Status: ACTIVE | Noted: 2023-11-03

## 2023-11-03 RX ORDER — NAPROXEN 500 MG/1
500 TABLET ORAL 3 TIMES DAILY PRN
COMMUNITY
End: 2024-02-27 | Stop reason: WASHOUT

## 2023-11-03 RX ORDER — BUPROPION HYDROCHLORIDE 100 MG/1
150 TABLET, EXTENDED RELEASE ORAL 2 TIMES DAILY
COMMUNITY
End: 2024-02-27 | Stop reason: WASHOUT

## 2023-11-03 RX ORDER — CYCLOBENZAPRINE HCL 10 MG
10 TABLET ORAL 3 TIMES DAILY PRN
COMMUNITY
End: 2024-02-27 | Stop reason: SDUPTHER

## 2023-11-03 RX ORDER — SILDENAFIL 100 MG/1
100 TABLET, FILM COATED ORAL DAILY PRN
COMMUNITY
End: 2024-02-27 | Stop reason: WASHOUT

## 2023-11-05 ASSESSMENT — ENCOUNTER SYMPTOMS
RECTAL PAIN: 0
SINUS PAIN: 0
CHEST TIGHTNESS: 0
NECK STIFFNESS: 0
STRIDOR: 0
DECREASED CONCENTRATION: 0
CONSTITUTIONAL NEGATIVE: 1
SORE THROAT: 0
EYE PAIN: 0
APPETITE CHANGE: 0
ABDOMINAL DISTENTION: 0
AGITATION: 0
SLEEP DISTURBANCE: 0
CONFUSION: 0
RHINORRHEA: 0
ACTIVITY CHANGE: 0
DIZZINESS: 0
BLOOD IN STOOL: 0
FEVER: 0
NERVOUS/ANXIOUS: 0
SPEECH DIFFICULTY: 0
DYSPHORIC MOOD: 0
FLANK PAIN: 0
SHORTNESS OF BREATH: 0
CONSTIPATION: 0
MYALGIAS: 0
ADENOPATHY: 0
DYSURIA: 0
HEADACHES: 0
ABDOMINAL PAIN: 0
POLYDIPSIA: 0
TROUBLE SWALLOWING: 0
ARTHRALGIAS: 0
DIARRHEA: 0
SEIZURES: 0
SINUS PRESSURE: 0
FATIGUE: 0
PHOTOPHOBIA: 0
PALPITATIONS: 0
COLOR CHANGE: 0
HEMATURIA: 0
COUGH: 0
POLYPHAGIA: 0

## 2023-11-06 ENCOUNTER — PHARMACY VISIT (OUTPATIENT)
Dept: PHARMACY | Facility: CLINIC | Age: 43
End: 2023-11-06
Payer: COMMERCIAL

## 2023-11-06 ENCOUNTER — TELEMEDICINE (OUTPATIENT)
Dept: PHARMACY | Facility: HOSPITAL | Age: 43
End: 2023-11-06
Payer: COMMERCIAL

## 2023-11-06 DIAGNOSIS — E11.42 TYPE 2 DIABETES MELLITUS WITH POLYNEUROPATHY (MULTI): Primary | ICD-10-CM

## 2023-11-06 DIAGNOSIS — E11.65 TYPE 2 DIABETES MELLITUS WITH HYPERGLYCEMIA, WITHOUT LONG-TERM CURRENT USE OF INSULIN (MULTI): ICD-10-CM

## 2023-11-06 RX ORDER — TIRZEPATIDE 7.5 MG/.5ML
7.5 INJECTION, SOLUTION SUBCUTANEOUS
Qty: 2 ML | Refills: 0 | Status: SHIPPED | OUTPATIENT
Start: 2023-11-06 | End: 2023-12-06 | Stop reason: SDUPTHER

## 2023-11-06 NOTE — ASSESSMENT & PLAN NOTE
Patients diabetes is uncontrolled as evidenced by his most recent A1c of 11.3% on 8/15/2023. Patient states he was not being compliant with medications, but with the high A1c he is motivated and ready to get his A1c back down. Patient will be due for an updated A1c in December.  INCREASE Mounjaro to 7.5mg after completing the last dose of 5mg on 11/11/2023. Will send Mounjaro 7.5mg to [facility] for mail order and to be assessed for patient assistance.  Patient was not taking the injection on a consistent day, but will now consistently be taking it on Saturdays. Discussed the importance of consistency with the injection. Patient understoon.  Patient is using the  copay card to help pay for the Mounjaro. Patient has a high deductible medication plan, which is causing high copays for his medications. The patient's copay is $410. Patient has a $50 coinsurance payment plus $360 being applied towards his deductible.   DECREASE Metformin XR 500mg 3 times a day to Metformin XR 500mg 2 times a day. Patient states this was done at his last PCP appointment. Patient was having side effects of stomach cramping and diarrhea, but states since decreasing the Metformin this has helped decrease the side effects.  Continue taking blood sugars 1-2 times daily. Patients blood sugars have been well controlled since starting back on medications.  Continue working towards healthy diet and lifestyle.

## 2023-11-06 NOTE — PROGRESS NOTES
Subjective   Patient ID: Taryn Ro is a 43 y.o. male who presents for Diabetes.    Referring Provider: Boo Kurtz,      Diabetes  He presents for his follow-up diabetic visit. He has type 2 diabetes mellitus. His disease course has been worsening. There are no hypoglycemic associated symptoms. There are no hypoglycemic complications. Current diabetic treatments: Metformin XR 500mg 1 tablet 2 times a day and Mounjaro 5mg under the skin once weekly. He is compliant with treatment all of the time (Patient states he was not completely compliant to his medications, however 3-6 months ago his blood sugars were consistantly high, and since then he has been compliant.). Diabetic current diet: Patient states he is working towards eating smaller portions. (Patient states when he has been testing his blood sugars since starting Mounjaro they have ranged from 100-110mg/dL. Patient states his lowest blood sugar was 85mg/dL (was experiencing a headache) and his highest was about 170mg/dL (after eating high carbohydrate meal).) An ACE inhibitor/angiotensin II receptor blocker is not being taken.      Patient Assistance Screening (VAF)  Patient verbally reports monthly or yearly income which is less than 400% federal poverty level  Application for program has been submitted for the following medications:   Mounjaro 7.5mg under the skin once weekly  Patient has been informed that program team will be reaching out to them to discuss necessary documentation, instructed to answer phone/return voicemail.   Patient aware this process may take up to 6 weeks.   If approved medication must be filled through [facility] pharmacy and may be picked up or mailed to patient.   Counseled patient on mechanism of action, side effects, contraindications, and what to do if the patient misses a dose. All patients questions were answered.     Objective     Labs  Lab Results   Component Value Date    BILITOT 0.7 09/02/2023    CALCIUM 9.1  09/02/2023    CO2 25 09/02/2023     09/02/2023    CREATININE 0.81 09/02/2023    GLUCOSE 133 (H) 09/02/2023    ALKPHOS 87 09/02/2023    K 4.3 09/02/2023    PROT 6.9 09/02/2023     09/02/2023    AST 35 09/02/2023    ALT 63 (H) 09/02/2023    BUN 11 09/02/2023    ANIONGAP 13 09/02/2023    ALBUMIN 4.3 09/02/2023    LIPASE 61 09/20/2022    GFRMALE >90 09/02/2023     Lab Results   Component Value Date    TRIG 562 (H) 09/02/2023    CHOL 188 09/02/2023    HDL 29.3 (A) 09/02/2023     Lab Results   Component Value Date    HGBA1C 11.3 (A) 08/15/2023     Current Outpatient Medications on File Prior to Visit   Medication Sig Dispense Refill    buPROPion SR (Wellbutrin SR) 100 mg 12 hr tablet Take 150 mg by mouth 2 times a day. Do not crush, chew, or split.  Take per direected      cyclobenzaprine (Flexeril) 10 mg tablet Take 1 tablet (10 mg) by mouth 3 times a day as needed for muscle spasms.      empagliflozin (Jardiance) 10 mg Take 1 tablet (10 mg) by mouth once daily. (Patient not taking: Reported on 10/30/2023) 28 tablet 0    metFORMIN  mg 24 hr tablet TAKE 1 TABLET BY MOUTH 3 TIMES A DAY WITH MEALS. 270 tablet 1    naproxen (EC Naprosyn) 500 mg EC tablet Take 1 tablet (500 mg) by mouth 3 times a day as needed for mild pain (1 - 3). Do not crush, chew, or split.      sildenafil (Viagra) 100 mg tablet Take 1 tablet (100 mg) by mouth once daily as needed for erectile dysfunction.      [DISCONTINUED] tirzepatide (Mounjaro) 5 mg/0.5 mL pen injector Inject 5 mg under the skin every 7 days. 2 mL 1     No current facility-administered medications on file prior to visit.      Assessment/Plan   Diabetes Education  Rule of 15: eating ~15 g of carbs when BG less than 80 (half cup juice, 3-4 glucose tabs).  Recognize symptoms of high and low blood sugar.   Eat a realistic healthy diet consisting of fruits, vegetables, fiber, protein food choices on a regular basis and be aware of portion/serving sizes. Reduce  carbohydrate consumption and always consume with protein and fat. Avoid foods high in saturated/trans fat, high salt content, and sweets and beverages with added sugars.  Limit alcohol consumption; alcohol may affect your blood sugar and cause hypoglycemia.   Stay active and incorporate ~30 mins of exercise into your daily routine to manage your weight and increase the body's acceptance of insulin.    PATIENT GOALS   Fasting B - 130 mg/dL 2-HR Postprandial BG:   Less than 180 mg/dL A1c:   Less than 7.0 %     Mounjaro Education:   Counseled patient on MOA, expectations, side effects, duration of therapy, contraindications, administration, and monitoring parameters  Answered all patient questions and concerns  Counseled patient on Mounjaro MOA, expectations, side effects, duration of therapy, administration, and monitoring parameters.  Provided detailed dosing and administration counseling to ensure proper technique.   Reviewed Mounjaro titration schedule, starting with 2.5 mg once weekly to a goal of 15 mg once weekly if tolerated  Counseled patient on the benefits of GLP-1ra glycemic control and weight loss  Reviewed storage requirements of Mounjaro when not in use, and when to administer the medication if a dose is missed.  Advised patient that they may experience improved satiety after meals and portion sizes of meals may be reduced as doses of Mounjaro increase.    Patient Goals  Patient states that he has been trying to eat smaller portions, more often, which has allowed him to help lose weight.  Patient would like to not be on insulin for treatment of diabetes. Discussed that Mounjaro is not insulin, and clinically is a very good option to reduce A1c and help with weight loss.    Problem List Items Addressed This Visit       Type 2 diabetes mellitus with polyneuropathy (CMS/HCC) - Primary     Patients diabetes is uncontrolled as evidenced by his most recent A1c of 11.3% on 8/15/2023. Patient states he  was not being compliant with medications, but with the high A1c he is motivated and ready to get his A1c back down. Patient will be due for an updated A1c in December.  INCREASE Mounjaro to 7.5mg after completing the last dose of 5mg on 11/11/2023. Will send Mounjaro 7.5mg to [facility] for mail order and to be assessed for patient assistance.  Patient was not taking the injection on a consistent day, but will now consistently be taking it on Saturdays. Discussed the importance of consistency with the injection. Patient understoon.  Patient is using the  copay card to help pay for the Mounjaro. Patient has a high deductible medication plan, which is causing high copays for his medications. The patient's copay is $410. Patient has a $50 coinsurance payment plus $360 being applied towards his deductible.   DECREASE Metformin XR 500mg 3 times a day to Metformin XR 500mg 2 times a day. Patient states this was done at his last PCP appointment. Patient was having side effects of stomach cramping and diarrhea, but states since decreasing the Metformin this has helped decrease the side effects.  Continue taking blood sugars 1-2 times daily. Patients blood sugars have been well controlled since starting back on medications.  Continue working towards healthy diet and lifestyle.         Relevant Medications    tirzepatide (Mounjaro) 7.5 mg/0.5 mL pen injector     Other Visit Diagnoses       Type 2 diabetes mellitus with hyperglycemia, without long-term current use of insulin (CMS/[facility])        Relevant Medications    tirzepatide (Mounjaro) 7.5 mg/0.5 mL pen injector          Follow up with the Clinical Pharmacy Team on 11/13/2023 to discuss determination of patient assistance. Discuss if patient is still willing to stay on the medication if not approved for patient assistance.    Continue all meds under the continuation of care with the referring provider and clinical pharmacy team.    Please reach out to  the Clinical Pharmacy Team if there are any further questions.     Verbal consent to manage patient's drug therapy was obtained from patient. They were informed they may decline to participate or withdraw from participation in pharmacy services at any time.    Maryann Bryant, PharmD  [phone]

## 2023-11-06 NOTE — PATIENT INSTRUCTIONS
Follow up in 6 weeks    Continue current medications and therapy for chronic medical conditions.    Patient was advised importance of proper diet/nutrition in addition adequate hydration. Patient was encouraged moderate exercise program to include 30 minutes daily for 5 days of the week or 150 minutes weekly. Patient will follow-up with us as scheduled.    I personally reviewed the OARRS report for this patient. I have considered the risks of abuse, dependence, addiction, and diversion.    UDS/CSA:    Discussed weight loss management foods including snacking and eating at nighttime

## 2023-11-09 ENCOUNTER — SPECIALTY PHARMACY (OUTPATIENT)
Dept: PHARMACY | Facility: CLINIC | Age: 43
End: 2023-11-09

## 2023-11-09 ENCOUNTER — PHARMACY VISIT (OUTPATIENT)
Dept: PHARMACY | Facility: CLINIC | Age: 43
End: 2023-11-09
Payer: COMMERCIAL

## 2023-11-09 PROCEDURE — RXMED WILLOW AMBULATORY MEDICATION CHARGE

## 2023-11-13 ENCOUNTER — TELEPHONE (OUTPATIENT)
Dept: PHARMACY | Facility: HOSPITAL | Age: 43
End: 2023-11-13
Payer: COMMERCIAL

## 2023-11-13 DIAGNOSIS — E11.65 TYPE 2 DIABETES MELLITUS WITH HYPERGLYCEMIA, WITHOUT LONG-TERM CURRENT USE OF INSULIN (MULTI): Primary | ICD-10-CM

## 2023-11-13 NOTE — TELEPHONE ENCOUNTER
Taryn Ro is a 43 y.o. male who was referred to the Clinical Pharmacy Team by Boo Kurtz DO. The patient has been approved for  Patient Assistance Program to receive Mounjaro for a $0 copay. Patient has been contacted on the status of the approval. Provided the patient with the [facility] phone number, and made patient aware that he  will be hearing from someone at [facility] to set up delivery for the prescriptions. Patient will be contacted each month to set up a delivery date prior to deliver of the medication. Discussed with the patient that if he  has not heard from the pharmacy, and is in need of medication, they can call the pharmacy to set this up.    Approval Duration: 1 year  Date of expiration: 11/10/2024    Follow up with Clinical Pharmacy Team on 12/6/2023 at 4:00PM.    Continue all meds under the continuation of care with the referring provider and clinical pharmacy team.    Please reach out to the Clinical Pharmacy Team if there are any further questions.     Verbal consent to manage patient's drug therapy was obtained from patient. They were informed they may decline to participate or withdraw from participation in pharmacy services at any time.    Maryann Bryant, PharmD  [phone]

## 2023-12-05 ENCOUNTER — APPOINTMENT (OUTPATIENT)
Dept: PRIMARY CARE | Facility: CLINIC | Age: 43
End: 2023-12-05
Payer: COMMERCIAL

## 2023-12-06 ENCOUNTER — TELEMEDICINE (OUTPATIENT)
Dept: PHARMACY | Facility: HOSPITAL | Age: 43
End: 2023-12-06
Payer: COMMERCIAL

## 2023-12-06 DIAGNOSIS — E11.65 TYPE 2 DIABETES MELLITUS WITH HYPERGLYCEMIA, WITHOUT LONG-TERM CURRENT USE OF INSULIN (MULTI): ICD-10-CM

## 2023-12-06 DIAGNOSIS — E11.42 TYPE 2 DIABETES MELLITUS WITH POLYNEUROPATHY (MULTI): Primary | ICD-10-CM

## 2023-12-06 PROCEDURE — RXMED WILLOW AMBULATORY MEDICATION CHARGE

## 2023-12-06 RX ORDER — TIRZEPATIDE 7.5 MG/.5ML
7.5 INJECTION, SOLUTION SUBCUTANEOUS
Qty: 2 ML | Refills: 0 | Status: SHIPPED | OUTPATIENT
Start: 2023-12-06 | End: 2024-01-03 | Stop reason: SDUPTHER

## 2023-12-06 NOTE — ASSESSMENT & PLAN NOTE
"Patients diabetes is uncontrolled as evidenced by his most recent A1c of 11.3% on 8/15/2023. Patient states he was not being compliant with medications, but with the high A1c he is motivated and ready to get his A1c back down. Patient will be due for an updated A1c in December.  CONTINUE Mounjaro to 7.5mg under the skin once weekly. Will send a refill to [facility] to be mailed to the patient  Patient is experiencing some GI side effects and \"intestinal problems\" while taking Mounjaro and Metformin. Will continue on 7.5mg for another month to see if that helps to decrease any side effects.  DECREASE Metformin XR 500mg 2 times a day to Metformin XR 500mg daily. Patient states he has always experienced GI side effects with Metformin  May consider discontinuing metformin and starting Jardiance  Continue taking blood sugars 1-2 times daily. Patients blood sugars have been well controlled since starting back on medications.  Continue working towards healthy diet and lifestyle.  Patient states since starting his weight loss journey he has lost about 40lbs. Would like to continue seeing weight loss with the increased doses of Mounjaro.  Patient is working on eating smaller portions and readjusting his diet to make healthy choices.  "

## 2023-12-06 NOTE — PROGRESS NOTES
Clinical Pharmacy Visit    Subjective   Patient ID: Taryn Ro is a 43 y.o. male who presents for Diabetes.    Referring Provider: Boo Kurtz,      Diabetes  He presents for his follow-up diabetic visit. He has type 2 diabetes mellitus. His disease course has been worsening. There are no hypoglycemic associated symptoms. There are no hypoglycemic complications. Current diabetic treatments: Metformin XR 500mg 1 tablet daily and Mounjaro 7.5mg under the skin once weekly. He is compliant with treatment all of the time (Patient states he was not completely compliant to his medications, however 3-6 months ago his blood sugars were consistantly high, and since then he has been compliant.). Diabetic current diet: Patient states he is working towards eating smaller portions. (Patient states when he has been testing his blood sugars since starting Mounjaro they have ranged from 110-120mg/dL.) An ACE inhibitor/angiotensin II receptor blocker is not being taken.       Objective     Labs  Lab Results   Component Value Date    BILITOT 0.7 09/02/2023    CALCIUM 9.1 09/02/2023    CO2 25 09/02/2023     09/02/2023    CREATININE 0.81 09/02/2023    GLUCOSE 133 (H) 09/02/2023    ALKPHOS 87 09/02/2023    K 4.3 09/02/2023    PROT 6.9 09/02/2023     09/02/2023    AST 35 09/02/2023    ALT 63 (H) 09/02/2023    BUN 11 09/02/2023    ANIONGAP 13 09/02/2023    ALBUMIN 4.3 09/02/2023    LIPASE 61 09/20/2022    GFRMALE >90 09/02/2023     Lab Results   Component Value Date    TRIG 562 (H) 09/02/2023    CHOL 188 09/02/2023    HDL 29.3 (A) 09/02/2023     Lab Results   Component Value Date    HGBA1C 11.3 (A) 08/15/2023     Current Outpatient Medications on File Prior to Visit   Medication Sig Dispense Refill    buPROPion SR (Wellbutrin SR) 100 mg 12 hr tablet Take 150 mg by mouth 2 times a day. Do not crush, chew, or split.  Take per direected      cyclobenzaprine (Flexeril) 10 mg tablet Take 1 tablet (10 mg) by mouth 3 times  a day as needed for muscle spasms.      metFORMIN  mg 24 hr tablet TAKE 1 TABLET BY MOUTH 3 TIMES A DAY WITH MEALS. (Patient taking differently: Take 1 tablet (500 mg) by mouth once daily in the evening. Take with meals.) 270 tablet 1    naproxen (EC Naprosyn) 500 mg EC tablet Take 1 tablet (500 mg) by mouth 3 times a day as needed for mild pain (1 - 3). Do not crush, chew, or split.      sildenafil (Viagra) 100 mg tablet Take 1 tablet (100 mg) by mouth once daily as needed for erectile dysfunction.      tirzepatide (Mounjaro) 7.5 mg/0.5 mL pen injector Inject 7.5 mg under the skin 1 (one) time per week. 2 mL 0    empagliflozin (Jardiance) 10 mg Take 1 tablet (10 mg) by mouth once daily. (Patient not taking: Reported on 10/30/2023) 28 tablet 0     No current facility-administered medications on file prior to visit.      Assessment/Plan   Diabetes Education  Rule of 15: eating ~15 g of carbs when BG less than 80 (half cup juice, 3-4 glucose tabs).  Recognize symptoms of high and low blood sugar.   Eat a realistic healthy diet consisting of fruits, vegetables, fiber, protein food choices on a regular basis and be aware of portion/serving sizes. Reduce carbohydrate consumption and always consume with protein and fat. Avoid foods high in saturated/trans fat, high salt content, and sweets and beverages with added sugars.  Limit alcohol consumption; alcohol may affect your blood sugar and cause hypoglycemia.   Stay active and incorporate ~30 mins of exercise into your daily routine to manage your weight and increase the body's acceptance of insulin.    PATIENT GOALS   Fasting B - 130 mg/dL 2-HR Postprandial BG:   Less than 180 mg/dL A1c:   Less than 7.0 %     Mounjaro Education:   Counseled patient on MOA, expectations, side effects, duration of therapy, contraindications, administration, and monitoring parameters  Answered all patient questions and concerns  Counseled patient on Mounjaro MOA, expectations,  "side effects, duration of therapy, administration, and monitoring parameters.  Provided detailed dosing and administration counseling to ensure proper technique.   Reviewed Mounjaro titration schedule, starting with 2.5 mg once weekly to a goal of 15 mg once weekly if tolerated  Counseled patient on the benefits of GLP-1ra glycemic control and weight loss  Reviewed storage requirements of Mounjaro when not in use, and when to administer the medication if a dose is missed.  Advised patient that they may experience improved satiety after meals and portion sizes of meals may be reduced as doses of Mounjaro increase.    Patient Goals  Patient states that he has been trying to eat smaller portions, more often, which has allowed him to help lose weight.  Patient would like to not be on insulin for treatment of diabetes. Discussed that Mounjaro is not insulin, and clinically is a very good option to reduce A1c and help with weight loss.    Problem List Items Addressed This Visit       Type 2 diabetes mellitus with polyneuropathy (CMS/[facility]) - Primary     Patients diabetes is uncontrolled as evidenced by his most recent A1c of 11.3% on 8/15/2023. Patient states he was not being compliant with medications, but with the high A1c he is motivated and ready to get his A1c back down. Patient will be due for an updated A1c in December.  CONTINUE Mounjaro to 7.5mg under the skin once weekly. Will send a refill to [facility] to be mailed to the patient  Patient is experiencing some GI side effects and \"intestinal problems\" while taking Mounjaro and Metformin. Will continue on 7.5mg for another month to see if that helps to decrease any side effects.  DECREASE Metformin XR 500mg 2 times a day to Metformin XR 500mg daily. Patient states he has always experienced GI side effects with Metformin  May consider discontinuing metformin and starting Jardiance  Continue taking blood sugars 1-2 times daily. Patients blood sugars have " been well controlled since starting back on medications.  Continue working towards healthy diet and lifestyle.  Patient states since starting his weight loss journey he has lost about 40lbs. Would like to continue seeing weight loss with the increased doses of Mounjaro.  Patient is working on eating smaller portions and readjusting his diet to make healthy choices.          Other Visit Diagnoses       Type 2 diabetes mellitus with hyperglycemia, without long-term current use of insulin (CMS/[facility])              Follow up with the Clinical Pharmacy Team on 1/3/2024 at 4PM.    Continue all meds under the continuation of care with the referring provider and clinical pharmacy team.    Please reach out to the Clinical Pharmacy Team if there are any further questions.     Verbal consent to manage patient's drug therapy was obtained from patient. They were informed they may decline to participate or withdraw from participation in pharmacy services at any time.    Maryann Bryant, PharmD  [phone]

## 2023-12-08 ENCOUNTER — PHARMACY VISIT (OUTPATIENT)
Dept: PHARMACY | Facility: CLINIC | Age: 43
End: 2023-12-08
Payer: COMMERCIAL

## 2024-01-03 ENCOUNTER — TELEMEDICINE (OUTPATIENT)
Dept: PHARMACY | Facility: HOSPITAL | Age: 44
End: 2024-01-03
Payer: COMMERCIAL

## 2024-01-03 DIAGNOSIS — E11.65 TYPE 2 DIABETES MELLITUS WITH HYPERGLYCEMIA, WITHOUT LONG-TERM CURRENT USE OF INSULIN (MULTI): ICD-10-CM

## 2024-01-03 DIAGNOSIS — E11.42 TYPE 2 DIABETES MELLITUS WITH POLYNEUROPATHY (MULTI): ICD-10-CM

## 2024-01-03 PROCEDURE — RXMED WILLOW AMBULATORY MEDICATION CHARGE

## 2024-01-03 RX ORDER — TIRZEPATIDE 7.5 MG/.5ML
7.5 INJECTION, SOLUTION SUBCUTANEOUS
Qty: 2 ML | Refills: 0 | Status: SHIPPED | OUTPATIENT
Start: 2024-01-03 | End: 2024-02-01 | Stop reason: ALTCHOICE

## 2024-01-03 NOTE — PROGRESS NOTES
Clinical Pharmacy Visit    Subjective   Patient ID: Taryn Ro is a 43 y.o. male who presents for Diabetes.    Referring Provider: Boo Kurtz,      Diabetes  He presents for his follow-up diabetic visit. He has type 2 diabetes mellitus. His disease course has been worsening. There are no hypoglycemic associated symptoms. There are no hypoglycemic complications. Current diabetic treatments: Metformin XR 500mg 1 tablet daily and Mounjaro 7.5mg under the skin once weekly. He is compliant with treatment all of the time (Patient states he was not completely compliant to his medications, however 3-6 months ago his blood sugars were consistantly high, and since then he has been compliant.). Diabetic current diet: Patient states he is working towards eating smaller portions. (Patient states when he has been testing his blood sugars since starting Mounjaro they have ranged from 110-120mg/dL. Patient was recently sick and on steroids, and states he saw an increase in blood sugars during this time.) An ACE inhibitor/angiotensin II receptor blocker is not being taken.     Objective     Labs  Lab Results   Component Value Date    BILITOT 0.7 09/02/2023    CALCIUM 9.1 09/02/2023    CO2 25 09/02/2023     09/02/2023    CREATININE 0.81 09/02/2023    GLUCOSE 133 (H) 09/02/2023    ALKPHOS 87 09/02/2023    K 4.3 09/02/2023    PROT 6.9 09/02/2023     09/02/2023    AST 35 09/02/2023    ALT 63 (H) 09/02/2023    BUN 11 09/02/2023    ANIONGAP 13 09/02/2023    ALBUMIN 4.3 09/02/2023    LIPASE 61 09/20/2022    GFRMALE >90 09/02/2023     Lab Results   Component Value Date    TRIG 562 (H) 09/02/2023    CHOL 188 09/02/2023    HDL 29.3 (A) 09/02/2023     Lab Results   Component Value Date    HGBA1C 11.3 (A) 08/15/2023     Current Outpatient Medications on File Prior to Visit   Medication Sig Dispense Refill    buPROPion SR (Wellbutrin SR) 100 mg 12 hr tablet Take 150 mg by mouth 2 times a day. Do not crush, chew, or  split.  Take per direected      cyclobenzaprine (Flexeril) 10 mg tablet Take 1 tablet (10 mg) by mouth 3 times a day as needed for muscle spasms.      metFORMIN  mg 24 hr tablet TAKE 1 TABLET BY MOUTH 3 TIMES A DAY WITH MEALS. (Patient taking differently: Take 1 tablet (500 mg) by mouth once daily in the evening. Take with meals.) 270 tablet 1    naproxen (EC Naprosyn) 500 mg EC tablet Take 1 tablet (500 mg) by mouth 3 times a day as needed for mild pain (1 - 3). Do not crush, chew, or split.      sildenafil (Viagra) 100 mg tablet Take 1 tablet (100 mg) by mouth once daily as needed for erectile dysfunction.      [DISCONTINUED] empagliflozin (Jardiance) 10 mg Take 1 tablet (10 mg) by mouth once daily. (Patient not taking: Reported on 10/30/2023) 28 tablet 0    [DISCONTINUED] tirzepatide (Mounjaro) 7.5 mg/0.5 mL pen injector Inject 7.5 mg under the skin 1 (one) time per week. 2 mL 0     No current facility-administered medications on file prior to visit.      Assessment/Plan   Diabetes Education  Rule of 15: eating ~15 g of carbs when BG less than 80 (half cup juice, 3-4 glucose tabs).  Recognize symptoms of high and low blood sugar.   Eat a realistic healthy diet consisting of fruits, vegetables, fiber, protein food choices on a regular basis and be aware of portion/serving sizes. Reduce carbohydrate consumption and always consume with protein and fat. Avoid foods high in saturated/trans fat, high salt content, and sweets and beverages with added sugars.  Limit alcohol consumption; alcohol may affect your blood sugar and cause hypoglycemia.   Stay active and incorporate ~30 mins of exercise into your daily routine to manage your weight and increase the body's acceptance of insulin.    PATIENT GOALS   Fasting B - 130 mg/dL 2-HR Postprandial BG:   Less than 180 mg/dL A1c:   Less than 7.0 %     Mounjaro Education:   Counseled patient on MOA, expectations, side effects, duration of therapy,  contraindications, administration, and monitoring parameters  Answered all patient questions and concerns  Counseled patient on Mounjaro MOA, expectations, side effects, duration of therapy, administration, and monitoring parameters.  Provided detailed dosing and administration counseling to ensure proper technique.   Reviewed Mounjaro titration schedule, starting with 2.5 mg once weekly to a goal of 15 mg once weekly if tolerated  Counseled patient on the benefits of GLP-1ra glycemic control and weight loss  Reviewed storage requirements of Mounjaro when not in use, and when to administer the medication if a dose is missed.  Advised patient that they may experience improved satiety after meals and portion sizes of meals may be reduced as doses of Mounjaro increase.  Jardiance Education:   Counseled patient on Jardiance MOA, expectations, side effects, duration of therapy, administration, and monitoring parameters.  Reviewed the benefits of SGLT-2i therapy, such as glycemic control and kidney and CV protection.  Advised patient to practice proper  hygiene to reduce risk of UTIs or yeast infections.  Advised patient to maintain adequate fluid intake to remain hydrated while on SGLT2i therapy.  Answered all patient questions and concerns.    Patient Goals  Patient states that he has been trying to eat smaller portions, more often, which has allowed him to help lose weight.  Patient would like to not be on insulin for treatment of diabetes. Discussed that Mounjaro is not insulin, and clinically is a very good option to reduce A1c and help with weight loss.    Problem List Items Addressed This Visit       Type 2 diabetes mellitus with polyneuropathy (CMS/[facility])     Patients diabetes is uncontrolled as evidenced by his most recent A1c of 11.3% on 8/15/2023. Patient states he was not being compliant with medications, but with the high A1c he is motivated and ready to get his A1c back down. Patient is due for an A1c, sent  "over to the lab.  CONTINUE Mounjaro to 7.5mg under the skin once weekly. Will send a refill to [facility] to be mailed to the patient  Patient is experiencing some GI side effects and \"intestinal problems\" while taking Mounjaro and Metformin. Will continue on 7.5mg for another month to see if that helps to decrease any side effects.  DISCONTINUE Metformin XR 500mg daily. Patient has been experiencing increased GI side effects with Metformin and Mounjaro. Will discontinue Metformin to try and alleviate the side effects.  START Jardiance 10mg daily. Medication sent to [facility] for mail order.   Continue taking blood sugars 1-2 times daily. Patients blood sugars have been well controlled since starting back on medications.  Continue working towards healthy diet and lifestyle.  Patient states since starting his weight loss journey he has lost about 40lbs. Would like to continue seeing weight loss with the increased doses of Mounjaro.  Patient is working on eating smaller portions and readjusting his diet to make healthy choices.         Relevant Medications    empagliflozin (Jardiance) 10 mg    tirzepatide (Mounjaro) 7.5 mg/0.5 mL pen injector    Other Relevant Orders    Follow Up In [facility] - Pharmacy    Hemoglobin A1c     Other Visit Diagnoses       Type 2 diabetes mellitus with hyperglycemia, without long-term current use of insulin (CMS/[facility])        Relevant Medications    empagliflozin (Jardiance) 10 mg    tirzepatide (Mounjaro) 7.5 mg/0.5 mL pen injector          Follow up with the Clinical Pharmacy Team on 1/31/2024 at 4PM.    Continue all meds under the continuation of care with the referring provider and clinical pharmacy team.    Please reach out to the Clinical Pharmacy Team if there are any further questions.     Verbal consent to manage patient's drug therapy was obtained from patient. They were informed they may decline to participate or withdraw from participation in " pharmacy services at any time.    Maryann Bryant, PharmD  [phone]

## 2024-01-03 NOTE — ASSESSMENT & PLAN NOTE
"Patients diabetes is uncontrolled as evidenced by his most recent A1c of 11.3% on 8/15/2023. Patient states he was not being compliant with medications, but with the high A1c he is motivated and ready to get his A1c back down. Patient is due for an A1c, sent over to the lab.  CONTINUE Mounjaro to 7.5mg under the skin once weekly. Will send a refill to [facility] to be mailed to the patient  Patient is experiencing some GI side effects and \"intestinal problems\" while taking Mounjaro and Metformin. Will continue on 7.5mg for another month to see if that helps to decrease any side effects.  DISCONTINUE Metformin XR 500mg daily. Patient has been experiencing increased GI side effects with Metformin and Mounjaro. Will discontinue Metformin to try and alleviate the side effects.  START Jardiance 10mg daily. Medication sent to [facility] for mail order.   Continue taking blood sugars 1-2 times daily. Patients blood sugars have been well controlled since starting back on medications.  Continue working towards healthy diet and lifestyle.  Patient states since starting his weight loss journey he has lost about 40lbs. Would like to continue seeing weight loss with the increased doses of Mounjaro.  Patient is working on eating smaller portions and readjusting his diet to make healthy choices.  "

## 2024-01-04 ENCOUNTER — PHARMACY VISIT (OUTPATIENT)
Dept: PHARMACY | Facility: CLINIC | Age: 44
End: 2024-01-04
Payer: COMMERCIAL

## 2024-01-27 PROCEDURE — RXMED WILLOW AMBULATORY MEDICATION CHARGE

## 2024-01-30 ENCOUNTER — PHARMACY VISIT (OUTPATIENT)
Dept: PHARMACY | Facility: CLINIC | Age: 44
End: 2024-01-30
Payer: COMMERCIAL

## 2024-01-30 ENCOUNTER — APPOINTMENT (OUTPATIENT)
Dept: PRIMARY CARE | Facility: CLINIC | Age: 44
End: 2024-01-30
Payer: COMMERCIAL

## 2024-01-31 ENCOUNTER — TELEMEDICINE (OUTPATIENT)
Dept: PHARMACY | Facility: HOSPITAL | Age: 44
End: 2024-01-31
Payer: COMMERCIAL

## 2024-01-31 DIAGNOSIS — E11.42 TYPE 2 DIABETES MELLITUS WITH POLYNEUROPATHY (MULTI): ICD-10-CM

## 2024-01-31 DIAGNOSIS — E11.42 TYPE 2 DIABETES MELLITUS WITH POLYNEUROPATHY (MULTI): Primary | ICD-10-CM

## 2024-02-01 ENCOUNTER — TELEMEDICINE (OUTPATIENT)
Dept: PHARMACY | Facility: HOSPITAL | Age: 44
End: 2024-02-01
Payer: COMMERCIAL

## 2024-02-01 DIAGNOSIS — E11.42 TYPE 2 DIABETES MELLITUS WITH POLYNEUROPATHY (MULTI): ICD-10-CM

## 2024-02-01 PROCEDURE — RXMED WILLOW AMBULATORY MEDICATION CHARGE

## 2024-02-01 RX ORDER — TIRZEPATIDE 10 MG/.5ML
10 INJECTION, SOLUTION SUBCUTANEOUS
Qty: 2 ML | Refills: 0 | Status: SHIPPED | OUTPATIENT
Start: 2024-02-01 | End: 2024-02-28 | Stop reason: ALTCHOICE

## 2024-02-01 NOTE — PROGRESS NOTES
Clinical Pharmacy Visit    Subjective   Patient ID: Taryn Ro is a 43 y.o. male who presents for Diabetes.    Referring Provider: oBo Kurtz,      Diabetes  He presents for his follow-up diabetic visit. He has type 2 diabetes mellitus. His disease course has been worsening. There are no hypoglycemic associated symptoms. There are no hypoglycemic complications. Current diabetic treatments: Jardiance 10mg daily and Mounjaro 7.5mg under the skin once weekly. He is compliant with treatment all of the time (Patient states he was not completely compliant to his medications, however 3-6 months ago his blood sugars were consistantly high, and since then he has been compliant.). Diabetic current diet: Patient states he is working towards eating smaller portions. (Patient states when he has been testing his blood sugars since starting Mounjaro they have ranged from 110-120mg/dL. Patient has not experienced any blood sugars <70mg/dL or >180mg/dL.) An ACE inhibitor/angiotensin II receptor blocker is not being taken.     Objective     Labs  Lab Results   Component Value Date    BILITOT 0.7 09/02/2023    CALCIUM 9.1 09/02/2023    CO2 25 09/02/2023     09/02/2023    CREATININE 0.81 09/02/2023    GLUCOSE 133 (H) 09/02/2023    ALKPHOS 87 09/02/2023    K 4.3 09/02/2023    PROT 6.9 09/02/2023     09/02/2023    AST 35 09/02/2023    ALT 63 (H) 09/02/2023    BUN 11 09/02/2023    ANIONGAP 13 09/02/2023    ALBUMIN 4.3 09/02/2023    LIPASE 61 09/20/2022    GFRMALE >90 09/02/2023     Lab Results   Component Value Date    TRIG 562 (H) 09/02/2023    CHOL 188 09/02/2023    HDL 29.3 (A) 09/02/2023     Lab Results   Component Value Date    HGBA1C 11.3 (A) 08/15/2023     Current Outpatient Medications on File Prior to Visit   Medication Sig Dispense Refill    buPROPion SR (Wellbutrin SR) 100 mg 12 hr tablet Take 150 mg by mouth 2 times a day. Do not crush, chew, or split.  Take per direected      cyclobenzaprine  (Flexeril) 10 mg tablet Take 1 tablet (10 mg) by mouth 3 times a day as needed for muscle spasms.      empagliflozin (Jardiance) 10 mg Take 1 tablet (10 mg) by mouth once daily. 30 tablet 3    naproxen (EC Naprosyn) 500 mg EC tablet Take 1 tablet (500 mg) by mouth 3 times a day as needed for mild pain (1 - 3). Do not crush, chew, or split.      sildenafil (Viagra) 100 mg tablet Take 1 tablet (100 mg) by mouth once daily as needed for erectile dysfunction.      [DISCONTINUED] metFORMIN  mg 24 hr tablet TAKE 1 TABLET BY MOUTH 3 TIMES A DAY WITH MEALS. (Patient taking differently: Take 1 tablet (500 mg) by mouth once daily in the evening. Take with meals.) 270 tablet 1    [DISCONTINUED] tirzepatide (Mounjaro) 7.5 mg/0.5 mL pen injector Inject 7.5 mg under the skin 1 (one) time per week. 2 mL 0     No current facility-administered medications on file prior to visit.      Assessment/Plan   Diabetes Education  Rule of 15: eating ~15 g of carbs when BG less than 80 (half cup juice, 3-4 glucose tabs).  Recognize symptoms of high and low blood sugar.   Eat a realistic healthy diet consisting of fruits, vegetables, fiber, protein food choices on a regular basis and be aware of portion/serving sizes. Reduce carbohydrate consumption and always consume with protein and fat. Avoid foods high in saturated/trans fat, high salt content, and sweets and beverages with added sugars.  Limit alcohol consumption; alcohol may affect your blood sugar and cause hypoglycemia.   Stay active and incorporate ~30 mins of exercise into your daily routine to manage your weight and increase the body's acceptance of insulin.    PATIENT GOALS   Fasting B - 130 mg/dL 2-HR Postprandial BG:   Less than 180 mg/dL A1c:   Less than 7.0 %     Mounjaro Education:   Counseled patient on MOA, expectations, side effects, duration of therapy, contraindications, administration, and monitoring parameters  Answered all patient questions and  concerns  Counseled patient on Mounjaro MOA, expectations, side effects, duration of therapy, administration, and monitoring parameters.  Provided detailed dosing and administration counseling to ensure proper technique.   Reviewed Mounjaro titration schedule, starting with 2.5 mg once weekly to a goal of 15 mg once weekly if tolerated  Counseled patient on the benefits of GLP-1ra glycemic control and weight loss  Reviewed storage requirements of Mounjaro when not in use, and when to administer the medication if a dose is missed.  Advised patient that they may experience improved satiety after meals and portion sizes of meals may be reduced as doses of Mounjaro increase.  Jardiance Education:   Counseled patient on Jardiance MOA, expectations, side effects, duration of therapy, administration, and monitoring parameters.  Reviewed the benefits of SGLT-2i therapy, such as glycemic control and kidney and CV protection.  Advised patient to practice proper  hygiene to reduce risk of UTIs or yeast infections.  Advised patient to maintain adequate fluid intake to remain hydrated while on SGLT2i therapy.  Answered all patient questions and concerns.    Patient Goals  Patient states that he has been trying to eat smaller portions, more often, which has allowed him to help lose weight.  Patient would like to not be on insulin for treatment of diabetes. Discussed that Mounjaro is not insulin, and clinically is a very good option to reduce A1c and help with weight loss.    Problem List Items Addressed This Visit       Type 2 diabetes mellitus with polyneuropathy (CMS/HCC)     atients diabetes is uncontrolled as evidenced by his most recent A1c of 11.3% on 8/15/2023. Patient states he was not being compliant with medications, but with the high A1c he is motivated and ready to get his A1c back down. Patient is due for an A1c, sent over to the lab and will get done prior to his next PCP visit.  INCREASE Mounjaro to 10mg under  the skin once weekly. Will send a refill to [facility] to be mailed to the patient  PA Approved; KEY: [id number]  Patient was having side effects with the Mounjaro and Metformin combination, but after stopping Metformin, patient has not had any side effects.  CONTINUE Jardiance 10mg daily. Patient is tolerating medication well. May consider increasing to 25mg daily if A1c is uncontrolled.  Continue taking blood sugars 1-2 times daily. Patients blood sugars have been well controlled since starting back on medications.  Continue working towards healthy diet and lifestyle.  Patient states since starting his weight loss journey he has lost about 40lbs. Would like to continue seeing weight loss with the increased doses of Mounjaro.  Patient is working on eating smaller portions and readjusting his diet to make healthy choices.         Relevant Medications    tirzepatide (Mounjaro) 10 mg/0.5 mL pen injector    Other Relevant Orders    Follow Up In [facility] - Pharmacy     Follow up with the Clinical Pharmacy Team on 2/28/2024 at 4PM.    Continue all meds under the continuation of care with the referring provider and clinical pharmacy team.    Please reach out to the Clinical Pharmacy Team if there are any further questions.     Verbal consent to manage patient's drug therapy was obtained from patient. They were informed they may decline to participate or withdraw from participation in pharmacy services at any time.    Maryann Bryant, PharmD  [phone]

## 2024-02-01 NOTE — ASSESSMENT & PLAN NOTE
[facility] his most recent A1c of 11.3% on 8/15/2023. Patient states he was not being compliant with medications, but with the high A1c he is motivated and ready to get his A1c back down. Patient is due for an A1c, sent over to the lab and will get done prior to his next PCP visit.  INCREASE Mounjaro to 10mg under the skin once weekly. Will send a refill to [facility] to be mailed to the patient  PA Approved; KEY: [id number]  Patient was having side effects with the Mounjaro and Metformin combination, but after stopping Metformin, patient has not had any side effects.  CONTINUE Jardiance 10mg daily. Patient is tolerating medication well. May consider increasing to 25mg daily if A1c is uncontrolled.  Continue taking blood sugars 1-2 times daily. Patients blood sugars have been well controlled since starting back on medications.  Continue working towards healthy diet and lifestyle.  Patient states since starting his weight loss journey he has lost about 40lbs. Would like to continue seeing weight loss with the increased doses of Mounjaro.  Patient is working on eating smaller portions and readjusting his diet to make healthy choices.

## 2024-02-05 ENCOUNTER — PHARMACY VISIT (OUTPATIENT)
Dept: PHARMACY | Facility: CLINIC | Age: 44
End: 2024-02-05
Payer: COMMERCIAL

## 2024-02-13 ENCOUNTER — APPOINTMENT (OUTPATIENT)
Dept: PRIMARY CARE | Facility: CLINIC | Age: 44
End: 2024-02-13
Payer: COMMERCIAL

## 2024-02-24 ENCOUNTER — LAB (OUTPATIENT)
Dept: LAB | Facility: LAB | Age: 44
End: 2024-02-24
Payer: COMMERCIAL

## 2024-02-24 DIAGNOSIS — E11.42 TYPE 2 DIABETES MELLITUS WITH POLYNEUROPATHY (MULTI): ICD-10-CM

## 2024-02-24 DIAGNOSIS — D72.820 LYMPHOCYTOSIS: ICD-10-CM

## 2024-02-24 DIAGNOSIS — E78.5 DYSLIPIDEMIA: ICD-10-CM

## 2024-02-24 LAB
ALBUMIN SERPL BCP-MCNC: 4.3 G/DL (ref 3.4–5)
ALP SERPL-CCNC: 87 U/L (ref 33–120)
ALT SERPL W P-5'-P-CCNC: 46 U/L (ref 10–52)
ANION GAP SERPL CALC-SCNC: 14 MMOL/L (ref 10–20)
AST SERPL W P-5'-P-CCNC: 25 U/L (ref 9–39)
BILIRUB SERPL-MCNC: 0.5 MG/DL (ref 0–1.2)
BUN SERPL-MCNC: 16 MG/DL (ref 6–23)
CALCIUM SERPL-MCNC: 9.2 MG/DL (ref 8.6–10.3)
CHLORIDE SERPL-SCNC: 107 MMOL/L (ref 98–107)
CHOLEST SERPL-MCNC: 172 MG/DL (ref 0–199)
CHOLESTEROL/HDL RATIO: 6.3
CO2 SERPL-SCNC: 23 MMOL/L (ref 21–32)
CREAT SERPL-MCNC: 0.93 MG/DL (ref 0.5–1.3)
EGFRCR SERPLBLD CKD-EPI 2021: >90 ML/MIN/1.73M*2
ERYTHROCYTE [DISTWIDTH] IN BLOOD BY AUTOMATED COUNT: 14 % (ref 11.5–14.5)
EST. AVERAGE GLUCOSE BLD GHB EST-MCNC: 120 MG/DL
GLUCOSE SERPL-MCNC: 122 MG/DL (ref 74–99)
HBA1C MFR BLD: 5.8 %
HCT VFR BLD AUTO: 51.1 % (ref 41–52)
HDLC SERPL-MCNC: 27.2 MG/DL
HGB BLD-MCNC: 16.4 G/DL (ref 13.5–17.5)
LDLC SERPL CALC-MCNC: ABNORMAL MG/DL
MCH RBC QN AUTO: 29 PG (ref 26–34)
MCHC RBC AUTO-ENTMCNC: 32.1 G/DL (ref 32–36)
MCV RBC AUTO: 90 FL (ref 80–100)
NON HDL CHOLESTEROL: 145 MG/DL (ref 0–149)
NRBC BLD-RTO: 0 /100 WBCS (ref 0–0)
PLATELET # BLD AUTO: 189 X10*3/UL (ref 150–450)
POTASSIUM SERPL-SCNC: 4.6 MMOL/L (ref 3.5–5.3)
PROT SERPL-MCNC: 7 G/DL (ref 6.4–8.2)
RBC # BLD AUTO: 5.66 X10*6/UL (ref 4.5–5.9)
SODIUM SERPL-SCNC: 139 MMOL/L (ref 136–145)
TRIGL SERPL-MCNC: 510 MG/DL (ref 0–149)
VLDL: ABNORMAL
WBC # BLD AUTO: 8.4 X10*3/UL (ref 4.4–11.3)

## 2024-02-24 PROCEDURE — 83036 HEMOGLOBIN GLYCOSYLATED A1C: CPT

## 2024-02-24 PROCEDURE — 80061 LIPID PANEL: CPT

## 2024-02-24 PROCEDURE — 36415 COLL VENOUS BLD VENIPUNCTURE: CPT

## 2024-02-24 PROCEDURE — 80053 COMPREHEN METABOLIC PANEL: CPT

## 2024-02-24 PROCEDURE — 85027 COMPLETE CBC AUTOMATED: CPT

## 2024-02-27 ENCOUNTER — OFFICE VISIT (OUTPATIENT)
Dept: PRIMARY CARE | Facility: CLINIC | Age: 44
End: 2024-02-27
Payer: COMMERCIAL

## 2024-02-27 VITALS
WEIGHT: 315 LBS | SYSTOLIC BLOOD PRESSURE: 114 MMHG | DIASTOLIC BLOOD PRESSURE: 72 MMHG | HEIGHT: 70 IN | BODY MASS INDEX: 45.1 KG/M2 | RESPIRATION RATE: 16 BRPM | OXYGEN SATURATION: 98 % | TEMPERATURE: 97.7 F | HEART RATE: 96 BPM

## 2024-02-27 DIAGNOSIS — M54.42 CHRONIC BILATERAL LOW BACK PAIN WITH BILATERAL SCIATICA: ICD-10-CM

## 2024-02-27 DIAGNOSIS — N52.9 ERECTILE DYSFUNCTION, UNSPECIFIED ERECTILE DYSFUNCTION TYPE: ICD-10-CM

## 2024-02-27 DIAGNOSIS — G89.29 CHRONIC BILATERAL LOW BACK PAIN WITH BILATERAL SCIATICA: ICD-10-CM

## 2024-02-27 DIAGNOSIS — E11.42 TYPE 2 DIABETES MELLITUS WITH POLYNEUROPATHY (MULTI): Primary | ICD-10-CM

## 2024-02-27 DIAGNOSIS — M54.41 CHRONIC BILATERAL LOW BACK PAIN WITH BILATERAL SCIATICA: ICD-10-CM

## 2024-02-27 DIAGNOSIS — E66.01 MORBID OBESITY (MULTI): ICD-10-CM

## 2024-02-27 DIAGNOSIS — J45.21 MILD INTERMITTENT ASTHMA WITH ACUTE EXACERBATION (HHS-HCC): ICD-10-CM

## 2024-02-27 PROCEDURE — 99214 OFFICE O/P EST MOD 30 MIN: CPT | Performed by: FAMILY MEDICINE

## 2024-02-27 RX ORDER — CYCLOBENZAPRINE HCL 10 MG
10 TABLET ORAL 3 TIMES DAILY PRN
Qty: 60 TABLET | Refills: 1 | Status: SHIPPED | OUTPATIENT
Start: 2024-02-27

## 2024-02-27 RX ORDER — TADALAFIL 20 MG/1
20 TABLET ORAL DAILY PRN
Qty: 15 TABLET | Refills: 0 | Status: SHIPPED | OUTPATIENT
Start: 2024-02-27

## 2024-02-27 ASSESSMENT — ENCOUNTER SYMPTOMS
CONFUSION: 0
SINUS PRESSURE: 0
DECREASED CONCENTRATION: 0
NECK STIFFNESS: 0
ABDOMINAL PAIN: 0
SORE THROAT: 0
ADENOPATHY: 0
ACTIVITY CHANGE: 0
BLOOD IN STOOL: 0
STRIDOR: 0
SPEECH DIFFICULTY: 0
CONSTIPATION: 0
PALPITATIONS: 0
SINUS PAIN: 0
EYE PAIN: 0
DYSPHORIC MOOD: 0
CHEST TIGHTNESS: 0
SHORTNESS OF BREATH: 0
APPETITE CHANGE: 0
ABDOMINAL DISTENTION: 0
COLOR CHANGE: 0
CONSTITUTIONAL NEGATIVE: 1
COUGH: 0
FEVER: 0
AGITATION: 0
SEIZURES: 0
DIARRHEA: 0
PHOTOPHOBIA: 0
POLYPHAGIA: 0
ARTHRALGIAS: 0
SLEEP DISTURBANCE: 0
DIZZINESS: 0
HEMATURIA: 0
RHINORRHEA: 0
NERVOUS/ANXIOUS: 0
TROUBLE SWALLOWING: 0
FATIGUE: 0
RECTAL PAIN: 0
FLANK PAIN: 0
HEADACHES: 0
POLYDIPSIA: 0
MYALGIAS: 0
DYSURIA: 0

## 2024-02-27 NOTE — PROGRESS NOTES
Subjective   Patient ID: Taryn Ro is a 44 y.o. male who presents for Diabetes.    Patient would like discuss viagra 100 mg change.     Patient had done blood work on 02/25/2024    Patient denies any symptoms or concerns today.    Diabetes  He presents for his follow-up diabetic visit. He has type 2 diabetes mellitus. Pertinent negatives for hypoglycemia include no confusion, dizziness, headaches, nervousness/anxiousness, seizures or speech difficulty. Pertinent negatives for diabetes include no chest pain, no fatigue, no polydipsia, no polyphagia and no polyuria. There are no hypoglycemic complications. There are no diabetic complications. Risk factors for coronary artery disease include diabetes mellitus.        Review of Systems   Constitutional: Negative.  Negative for activity change, appetite change, fatigue and fever.   HENT:  Negative for congestion, dental problem, ear discharge, ear pain, mouth sores, rhinorrhea, sinus pressure, sinus pain, sore throat, tinnitus and trouble swallowing.    Eyes:  Positive for visual disturbance. Negative for photophobia and pain.   Respiratory:  Negative for cough, chest tightness, shortness of breath and stridor.    Cardiovascular:  Negative for chest pain and palpitations.   Gastrointestinal:  Negative for abdominal distention, abdominal pain, blood in stool, constipation, diarrhea and rectal pain.   Endocrine: Negative for cold intolerance, heat intolerance, polydipsia, polyphagia and polyuria.   Genitourinary:  Negative for dysuria, flank pain, hematuria and urgency.   Musculoskeletal:  Negative for arthralgias, gait problem, myalgias and neck stiffness.   Skin:  Negative for color change and rash.   Allergic/Immunologic: Negative for environmental allergies and food allergies.   Neurological:  Negative for dizziness, seizures, syncope, speech difficulty and headaches.   Hematological:  Negative for adenopathy.   Psychiatric/Behavioral:  Negative for agitation,  "confusion, decreased concentration, dysphoric mood and sleep disturbance. The patient is not nervous/anxious.        Objective   /72 (BP Location: Left arm, Patient Position: Sitting, BP Cuff Size: Adult long)   Pulse 96   Temp 36.5 °C (97.7 °F)   Resp 16   Ht 1.778 m (5' 10\")   Wt (!) 164 kg (361 lb)   SpO2 98%   BMI 51.80 kg/m²     Physical Exam  Vitals reviewed.   Constitutional:       General: He is not in acute distress.     Appearance: He is obese. He is not ill-appearing or diaphoretic.   HENT:      Head: Normocephalic.      Right Ear: Tympanic membrane and external ear normal.      Left Ear: Tympanic membrane and external ear normal.      Nose: Nose normal. No congestion.      Mouth/Throat:      Pharynx: No posterior oropharyngeal erythema.   Eyes:      General:         Right eye: No discharge.         Left eye: No discharge.      Extraocular Movements: Extraocular movements intact.      Conjunctiva/sclera: Conjunctivae normal.      Pupils: Pupils are equal, round, and reactive to light.   Cardiovascular:      Rate and Rhythm: Normal rate and regular rhythm.      Pulses: Normal pulses.      Heart sounds: Normal heart sounds. No murmur heard.  Pulmonary:      Effort: Pulmonary effort is normal. No respiratory distress.      Breath sounds: Normal breath sounds. No wheezing or rales.   Chest:      Chest wall: No tenderness.   Abdominal:      General: Bowel sounds are normal. There is distension.      Palpations: There is no mass.      Tenderness: There is no abdominal tenderness. There is no guarding.   Musculoskeletal:         General: No tenderness. Normal range of motion.      Cervical back: Normal range of motion and neck supple. No tenderness.      Right lower leg: No edema.      Left lower leg: No edema.   Skin:     General: Skin is dry.      Coloration: Skin is not jaundiced.      Findings: No bruising, erythema or rash.   Neurological:      General: No focal deficit present.      Mental " Status: He is alert and oriented to person, place, and time. Mental status is at baseline.      Cranial Nerves: No cranial nerve deficit.      Sensory: No sensory deficit.      Coordination: Coordination normal.      Gait: Gait normal.   Psychiatric:         Mood and Affect: Mood normal.         Thought Content: Thought content normal.         Judgment: Judgment normal.         Assessment/Plan   Problem List Items Addressed This Visit             ICD-10-CM    Erectile dysfunction N52.9    Relevant Medications    tadalafil (Cialis) 20 mg tablet    Mild asthma with acute exacerbation J45.901    Morbid obesity (CMS/[facility]) E66.01    Type 2 diabetes mellitus with polyneuropathy (CMS/[facility]) - Primary E11.42    Low back pain with sciatica M54.40    Relevant Medications    cyclobenzaprine (Flexeril) 10 mg tablet         Scribe Attestation  By signing my name below, IAlicia RMA, Scribe   attest that this documentation has been prepared under the direction and in the presence of Boo Kurtz DO.   Provider Attestation - Scribe documentation    All medical record entries made by the Scribe were at my direction and personally dictated by me. I have reviewed the chart and agree that the record accurately reflects my personal performance of the history, physical exam, discussion and plan.

## 2024-02-27 NOTE — PATIENT INSTRUCTIONS
Follow up in 4 months    Continue current medications and therapy for chronic medical conditions.    Patient was advised importance of proper diet/nutrition in addition adequate hydration. Patient was encouraged moderate exercise program to include 30 minutes daily for 5 days of the week or 150 minutes weekly. Patient will follow-up with us as scheduled.    Review laboratory results from February 2024    Start Cialis 20 mg as directed    Start cyclobenzaprine 10 mg as directed

## 2024-02-28 ENCOUNTER — TELEMEDICINE (OUTPATIENT)
Dept: PHARMACY | Facility: HOSPITAL | Age: 44
End: 2024-02-28
Payer: COMMERCIAL

## 2024-02-28 DIAGNOSIS — E11.42 TYPE 2 DIABETES MELLITUS WITH POLYNEUROPATHY (MULTI): Primary | ICD-10-CM

## 2024-02-28 PROCEDURE — RXMED WILLOW AMBULATORY MEDICATION CHARGE

## 2024-02-28 RX ORDER — TIRZEPATIDE 12.5 MG/.5ML
12.5 INJECTION, SOLUTION SUBCUTANEOUS
Qty: 2 ML | Refills: 0 | Status: SHIPPED | OUTPATIENT
Start: 2024-02-28 | End: 2024-03-27 | Stop reason: SDUPTHER

## 2024-02-28 NOTE — ASSESSMENT & PLAN NOTE
Patients diabetes is well controlled as evidenced by his most recent A1c of 5.8% on 2/24/2024. This is a huge increase from last A1c. Congratulated patient on this decrease!  INCREASE Mounjaro to 12.5mg under the skin once weekly. Will send a refill to [facility] to be mailed to the patient  [place] Approved; KEY: [id number]  Patient was having side effects with the Mounjaro and Metformin combination, but after stopping Metformin, patient has not had any side effects.  CONTINUE Jardiance 10mg daily. Patient is tolerating medication well.  Continue taking blood sugars 1-2 times weekly. Patients blood sugars have been well controlled since starting back on medications.  Continue working towards healthy diet and lifestyle.  Patient states since starting his weight loss journey he has lost about 40lbs. Would like to continue seeing weight loss with the increased doses of Mounjaro.  Patient is working on eating smaller portions and readjusting his diet to make healthy choices.  Patient is interested in a diabetes class. Encouraged patient to reach out to Dr. Kurtz to see if he has any recommendations.

## 2024-02-28 NOTE — PROGRESS NOTES
Clinical Pharmacy Visit    Subjective   Patient ID: Taryn Ro is a 43 y.o. male who presents for Diabetes.    Referring Provider: Boo Kurtz,      Diabetes  He presents for his follow-up diabetic visit. He has type 2 diabetes mellitus. His disease course has been worsening. There are no hypoglycemic associated symptoms. There are no hypoglycemic complications. Current diabetic treatments: Jardiance 10mg daily and Mounjaro 10mg under the skin once weekly. He is compliant with treatment all of the time (Patient states he was not completely compliant to his medications, however 3-6 months ago his blood sugars were consistantly high, and since then he has been compliant.). Diabetic current diet: Patient states he is working towards eating smaller portions. (Patient states when he has been testing his blood sugars since starting Mounjaro they have ranged from 110-120mg/dL. Patient has not experienced any blood sugars <70mg/dL or >180mg/dL.) An ACE inhibitor/angiotensin II receptor blocker is not being taken.     Objective     Labs  Lab Results   Component Value Date    BILITOT 0.5 02/24/2024    CALCIUM 9.2 02/24/2024    CO2 23 02/24/2024     02/24/2024    CREATININE 0.93 02/24/2024    GLUCOSE 122 (H) 02/24/2024    ALKPHOS 87 02/24/2024    K 4.6 02/24/2024    PROT 7.0 02/24/2024     02/24/2024    AST 25 02/24/2024    ALT 46 02/24/2024    BUN 16 02/24/2024    ANIONGAP 14 02/24/2024    ALBUMIN 4.3 02/24/2024    LIPASE 61 09/20/2022    GFRMALE >90 09/02/2023     Lab Results   Component Value Date    TRIG 510 (H) 02/24/2024    CHOL 172 02/24/2024    LDLCALC  02/24/2024      Comment:      The calculation of LDL and VLDL are inaccurate when the Triglycerides are greater than 400 mg/dL or when the patient is non-fasting. If LDL measurement is necessary contact the testing laboratory for an alternative LDL assay.                                  Near   Borderline      AGE      Desirable  Optimal    High      High     Very High     0-19 Y     0 - 109     ---    110-129   >/= 130     ----    20-24 Y     0 - 119     ---    120-159   >/= 160     ----      >24 Y     0 -  99   100-129  130-159   160-189     >/=190      HDL 27.2 02/24/2024     Lab Results   Component Value Date    HGBA1C 5.8 (H) 02/24/2024     Current Outpatient Medications on File Prior to Visit   Medication Sig Dispense Refill    cyclobenzaprine (Flexeril) 10 mg tablet Take 1 tablet (10 mg) by mouth 3 times a day as needed for muscle spasms. 60 tablet 1    empagliflozin (Jardiance) 10 mg Take 1 tablet (10 mg) by mouth once daily. 30 tablet 3    tadalafil (Cialis) 20 mg tablet Take 1 tablet (20 mg) by mouth once daily as needed for erectile dysfunction. 15 tablet 0    [DISCONTINUED] buPROPion SR (Wellbutrin SR) 100 mg 12 hr tablet Take 150 mg by mouth 2 times a day. Do not crush, chew, or split.  Take per direected      [DISCONTINUED] cyclobenzaprine (Flexeril) 10 mg tablet Take 1 tablet (10 mg) by mouth 3 times a day as needed for muscle spasms.      [DISCONTINUED] naproxen (EC Naprosyn) 500 mg EC tablet Take 1 tablet (500 mg) by mouth 3 times a day as needed for mild pain (1 - 3). Do not crush, chew, or split.      [DISCONTINUED] sildenafil (Viagra) 100 mg tablet Take 1 tablet (100 mg) by mouth once daily as needed for erectile dysfunction.      [DISCONTINUED] tirzepatide (Mounjaro) 10 mg/0.5 mL pen injector Inject 10 mg under the skin 1 (one) time per week. 2 mL 0     No current facility-administered medications on file prior to visit.      Assessment/Plan   Diabetes Education  Rule of 15: eating ~15 g of carbs when BG less than 80 (half cup juice, 3-4 glucose tabs).  Recognize symptoms of high and low blood sugar.   Eat a realistic healthy diet consisting of fruits, vegetables, fiber, protein food choices on a regular basis and be aware of portion/serving sizes. Reduce carbohydrate consumption and always consume with protein and fat. Avoid foods high  in saturated/trans fat, high salt content, and sweets and beverages with added sugars.  Limit alcohol consumption; alcohol may affect your blood sugar and cause hypoglycemia.   Stay active and incorporate ~30 mins of exercise into your daily routine to manage your weight and increase the body's acceptance of insulin.    PATIENT GOALS   Fasting B - 130 mg/dL 2-HR Postprandial BG:   Less than 180 mg/dL A1c:   Less than 7.0 %     Mounjaro Education:   Counseled patient on MOA, expectations, side effects, duration of therapy, contraindications, administration, and monitoring parameters  Answered all patient questions and concerns  Counseled patient on Mounjaro MOA, expectations, side effects, duration of therapy, administration, and monitoring parameters.  Provided detailed dosing and administration counseling to ensure proper technique.   Reviewed Mounjaro titration schedule, starting with 2.5 mg once weekly to a goal of 15 mg once weekly if tolerated  Counseled patient on the benefits of GLP-1ra glycemic control and weight loss  Reviewed storage requirements of Mounjaro when not in use, and when to administer the medication if a dose is missed.  Advised patient that they may experience improved satiety after meals and portion sizes of meals may be reduced as doses of Mounjaro increase.  Jardiance Education:   Counseled patient on Jardiance MOA, expectations, side effects, duration of therapy, administration, and monitoring parameters.  Reviewed the benefits of SGLT-2i therapy, such as glycemic control and kidney and CV protection.  Advised patient to practice proper  hygiene to reduce risk of UTIs or yeast infections.  Advised patient to maintain adequate fluid intake to remain hydrated while on SGLT2i therapy.  Answered all patient questions and concerns.    Patient Goals  Patient states that he has been trying to eat smaller portions, more often, which has allowed him to help lose weight.  Patient would  like to not be on insulin for treatment of diabetes. Discussed that Mounjaro is not insulin, and clinically is a very good option to reduce A1c and help with weight loss.    Problem List Items Addressed This Visit       Type 2 diabetes mellitus with polyneuropathy (CMS/HCC) - Primary     Patients diabetes is well controlled as evidenced by his most recent A1c of 5.8% on 2/24/2024. This is a huge increase from last A1c. Congratulated patient on this decrease!  INCREASE Mounjaro to 12.5mg under the skin once weekly. Will send a refill to [facility] to be mailed to the patient  [place] Approved; KEY: [id number]  Patient was having side effects with the Mounjaro and Metformin combination, but after stopping Metformin, patient has not had any side effects.  CONTINUE Jardiance 10mg daily. Patient is tolerating medication well.  Continue taking blood sugars 1-2 times weekly. Patients blood sugars have been well controlled since starting back on medications.  Continue working towards healthy diet and lifestyle.  Patient states since starting his weight loss journey he has lost about 40lbs. Would like to continue seeing weight loss with the increased doses of Mounjaro.  Patient is working on eating smaller portions and readjusting his diet to make healthy choices.  Patient is interested in a diabetes class. Encouraged patient to reach out to Dr. Kurtz to see if he has any recommendations.         Relevant Medications    tirzepatide (Mounjaro) 12.5 mg/0.5 mL pen injector    Other Relevant Orders    Follow Up In [facility] - Pharmacy     Follow up with the Clinical Pharmacy Team on 3/27/2024 at 4:30PM.    Continue all meds under the continuation of care with the referring provider and clinical pharmacy team.    Please reach out to the Clinical Pharmacy Team if there are any further questions.     Verbal consent to manage patient's drug therapy was obtained from patient. They were informed they may decline to  participate or withdraw from participation in pharmacy services at any time.    Maryann Bryant, PharmD  [phone]

## 2024-03-01 ENCOUNTER — PHARMACY VISIT (OUTPATIENT)
Dept: PHARMACY | Facility: CLINIC | Age: 44
End: 2024-03-01
Payer: COMMERCIAL

## 2024-03-01 PROCEDURE — RXMED WILLOW AMBULATORY MEDICATION CHARGE

## 2024-03-27 ENCOUNTER — TELEMEDICINE (OUTPATIENT)
Dept: PHARMACY | Facility: HOSPITAL | Age: 44
End: 2024-03-27
Payer: COMMERCIAL

## 2024-03-27 DIAGNOSIS — E11.42 TYPE 2 DIABETES MELLITUS WITH POLYNEUROPATHY (MULTI): Primary | ICD-10-CM

## 2024-03-27 RX ORDER — TIRZEPATIDE 12.5 MG/.5ML
12.5 INJECTION, SOLUTION SUBCUTANEOUS
Qty: 2 ML | Refills: 3 | Status: SHIPPED | OUTPATIENT
Start: 2024-03-27

## 2024-03-27 NOTE — PROGRESS NOTES
Clinical Pharmacy Visit    Subjective   Patient ID: Taryn Ro is a 44 y.o. male who presents for Diabetes.    Referring Provider: Boo Kurtz,      Diabetes  He presents for his follow-up diabetic visit. He has type 2 diabetes mellitus. His disease course has been worsening. There are no hypoglycemic associated symptoms. There are no hypoglycemic complications. Current diabetic treatments: Jardiance 10mg daily and Mounjaro 12.5mg under the skin once weekly. He is compliant with treatment all of the time (Patient states he was not completely compliant to his medications, however 3-6 months ago his blood sugars were consistantly high, and since then he has been compliant.). Diabetic current diet: Patient states he is working towards eating smaller portions. (Patient states when he has been testing his blood sugars since starting Mounjaro they have ranged from 110-120mg/dL. Patient has not experienced any blood sugars <70mg/dL or >180mg/dL.) An ACE inhibitor/angiotensin II receptor blocker is not being taken.     Objective     Labs  Lab Results   Component Value Date    BILITOT 0.5 02/24/2024    CALCIUM 9.2 02/24/2024    CO2 23 02/24/2024     02/24/2024    CREATININE 0.93 02/24/2024    GLUCOSE 122 (H) 02/24/2024    ALKPHOS 87 02/24/2024    K 4.6 02/24/2024    PROT 7.0 02/24/2024     02/24/2024    AST 25 02/24/2024    ALT 46 02/24/2024    BUN 16 02/24/2024    ANIONGAP 14 02/24/2024    ALBUMIN 4.3 02/24/2024    LIPASE 61 09/20/2022    GFRMALE >90 09/02/2023     Lab Results   Component Value Date    TRIG 510 (H) 02/24/2024    CHOL 172 02/24/2024    LDLCALC  02/24/2024      Comment:      The calculation of LDL and VLDL are inaccurate when the Triglycerides are greater than 400 mg/dL or when the patient is non-fasting. If LDL measurement is necessary contact the testing laboratory for an alternative LDL assay.                                  Near   Borderline      AGE      Desirable  Optimal     High     High     Very High     0-19 Y     0 - 109     ---    110-129   >/= 130     ----    20-24 Y     0 - 119     ---    120-159   >/= 160     ----      >24 Y     0 -  99   100-129  130-159   160-189     >/=190      HDL 27.2 2024     Lab Results   Component Value Date    HGBA1C 5.8 (H) 2024     Current Outpatient Medications on File Prior to Visit   Medication Sig Dispense Refill    cyclobenzaprine (Flexeril) 10 mg tablet Take 1 tablet (10 mg) by mouth 3 times a day as needed for muscle spasms. 60 tablet 1    empagliflozin (Jardiance) 10 mg Take 1 tablet (10 mg) by mouth once daily. 30 tablet 3    tadalafil (Cialis) 20 mg tablet Take 1 tablet (20 mg) by mouth once daily as needed for erectile dysfunction. 15 tablet 0    [DISCONTINUED] tirzepatide (Mounjaro) 12.5 mg/0.5 mL pen injector Inject 12.5 mg under the skin 1 (one) time per week. 2 mL 0     No current facility-administered medications on file prior to visit.      Assessment/Plan   Diabetes Education  Rule of 15: eating ~15 g of carbs when BG less than 80 (half cup juice, 3-4 glucose tabs).  Recognize symptoms of high and low blood sugar.   Eat a realistic healthy diet consisting of fruits, vegetables, fiber, protein food choices on a regular basis and be aware of portion/serving sizes. Reduce carbohydrate consumption and always consume with protein and fat. Avoid foods high in saturated/trans fat, high salt content, and sweets and beverages with added sugars.  Limit alcohol consumption; alcohol may affect your blood sugar and cause hypoglycemia.   Stay active and incorporate ~30 mins of exercise into your daily routine to manage your weight and increase the body's acceptance of insulin.    PATIENT GOALS   Fasting B - 130 mg/dL 2-HR Postprandial BG:   Less than 180 mg/dL A1c:   Less than 7.0 %     Mounjaro Education:   Counseled patient on MOA, expectations, side effects, duration of therapy, contraindications, administration, and  monitoring parameters  Answered all patient questions and concerns  Counseled patient on Mounjaro MOA, expectations, side effects, duration of therapy, administration, and monitoring parameters.  Provided detailed dosing and administration counseling to ensure proper technique.   Reviewed Mounjaro titration schedule, starting with 2.5 mg once weekly to a goal of 15 mg once weekly if tolerated  Counseled patient on the benefits of GLP-1ra glycemic control and weight loss  Reviewed storage requirements of Mounjaro when not in use, and when to administer the medication if a dose is missed.  Advised patient that they may experience improved satiety after meals and portion sizes of meals may be reduced as doses of Mounjaro increase.  Jardiance Education:   Counseled patient on Jardiance MOA, expectations, side effects, duration of therapy, administration, and monitoring parameters.  Reviewed the benefits of SGLT-2i therapy, such as glycemic control and kidney and CV protection.  Advised patient to practice proper  hygiene to reduce risk of UTIs or yeast infections.  Advised patient to maintain adequate fluid intake to remain hydrated while on SGLT2i therapy.  Answered all patient questions and concerns.    Patient Goals  Patient states that he has been trying to eat smaller portions, more often, which has allowed him to help lose weight.  Patient would like to not be on insulin for treatment of diabetes. Discussed that Mounjaro is not insulin, and clinically is a very good option to reduce A1c and help with weight loss.    Problem List Items Addressed This Visit       Type 2 diabetes mellitus with polyneuropathy (CMS/HCC) - Primary     Patients diabetes is well controlled as evidenced by his most recent A1c of 5.8% on 2/24/2024. This is a huge increase from last A1c. Congratulated patient on this decrease!  CONTINUE Mounjaro to 12.5mg under the skin once weekly. Will send a refill to [facility] to be mailed to  the patient  [place] Approved; KEY: [id number]  Patient was having side effects with the Mounjaro and Metformin combination, but after stopping Metformin, patient has not had any side effects.  CONTINUE Jardiance 10mg daily. Patient is tolerating medication well.  Continue taking blood sugars 1-2 times weekly. Patients blood sugars have been well controlled since starting back on medications.  Continue working towards healthy diet and lifestyle.  Patient states since starting his weight loss journey he has lost about 40lbs. Would like to continue seeing weight loss with the increased doses of Mounjaro.  Patient is working on eating smaller portions and readjusting his diet to make healthy choices.  Patient is interested in a diabetes class. Encouraged patient to reach out to Dr. Kurtz to see if he has any recommendations.         Relevant Medications    tirzepatide (Mounjaro) 12.5 mg/0.5 mL pen injector    Other Relevant Orders    Follow Up In [facility] - Pharmacy     Follow up with the Clinical Pharmacy Team on 6/26/2024 at 4:30PM.    Continue all meds under the continuation of care with the referring provider and clinical pharmacy team.    Please reach out to the Clinical Pharmacy Team if there are any further questions.     Verbal consent to manage patient's drug therapy was obtained from patient. They were informed they may decline to participate or withdraw from participation in pharmacy services at any time.    Maryann Bryant, PharmD  [phone]

## 2024-03-27 NOTE — ASSESSMENT & PLAN NOTE
Patients diabetes is well controlled as evidenced by his most recent A1c of 5.8% on 2/24/2024. This is a huge increase from last A1c. Congratulated patient on this decrease!  CONTINUE Mounjaro to 12.5mg under the skin once weekly. Will send a refill to [facility] to be mailed to the patient  [place] Approved; KEY: [id number]  Patient was having side effects with the Mounjaro and Metformin combination, but after stopping Metformin, patient has not had any side effects.  CONTINUE Jardiance 10mg daily. Patient is tolerating medication well.  Continue taking blood sugars 1-2 times weekly. Patients blood sugars have been well controlled since starting back on medications.  Continue working towards healthy diet and lifestyle.  Patient states since starting his weight loss journey he has lost about 40lbs. Would like to continue seeing weight loss with the increased doses of Mounjaro.  Patient is working on eating smaller portions and readjusting his diet to make healthy choices.  Patient is interested in a diabetes class. Encouraged patient to reach out to Dr. Kurtz to see if he has any recommendations.

## 2024-04-03 PROCEDURE — RXMED WILLOW AMBULATORY MEDICATION CHARGE

## 2024-04-04 ENCOUNTER — PHARMACY VISIT (OUTPATIENT)
Dept: PHARMACY | Facility: CLINIC | Age: 44
End: 2024-04-04
Payer: COMMERCIAL

## 2024-04-25 PROCEDURE — RXMED WILLOW AMBULATORY MEDICATION CHARGE

## 2024-04-30 ENCOUNTER — PHARMACY VISIT (OUTPATIENT)
Dept: PHARMACY | Facility: CLINIC | Age: 44
End: 2024-04-30
Payer: COMMERCIAL

## 2024-05-23 PROCEDURE — RXMED WILLOW AMBULATORY MEDICATION CHARGE

## 2024-05-24 ENCOUNTER — PHARMACY VISIT (OUTPATIENT)
Dept: PHARMACY | Facility: CLINIC | Age: 44
End: 2024-05-24
Payer: COMMERCIAL

## 2024-06-15 ENCOUNTER — TELEPHONE (OUTPATIENT)
Dept: PRIMARY CARE | Facility: CLINIC | Age: 44
End: 2024-06-15
Payer: COMMERCIAL

## 2024-06-15 NOTE — TELEPHONE ENCOUNTER
Records indicate that patient is in need of a statin due to diabetes. Please advise if this is appropriate for patient, which statin, or if a lipid/appointment is needed prior.

## 2024-06-20 PROCEDURE — RXMED WILLOW AMBULATORY MEDICATION CHARGE

## 2024-06-22 ENCOUNTER — PHARMACY VISIT (OUTPATIENT)
Dept: PHARMACY | Facility: CLINIC | Age: 44
End: 2024-06-22
Payer: COMMERCIAL

## 2024-06-26 ENCOUNTER — APPOINTMENT (OUTPATIENT)
Dept: PHARMACY | Facility: HOSPITAL | Age: 44
End: 2024-06-26
Payer: COMMERCIAL

## 2024-06-26 DIAGNOSIS — E11.42 TYPE 2 DIABETES MELLITUS WITH POLYNEUROPATHY (MULTI): Primary | ICD-10-CM

## 2024-06-26 RX ORDER — TIRZEPATIDE 12.5 MG/.5ML
12.5 INJECTION, SOLUTION SUBCUTANEOUS
Qty: 2 ML | Refills: 3 | Status: SHIPPED | OUTPATIENT
Start: 2024-06-30

## 2024-06-26 NOTE — PROGRESS NOTES
Clinical Pharmacy Visit    Subjective   Patient ID: Taryn Ro is a 44 y.o. male who presents for Diabetes.    Referring Provider: Boo Kurtz,      Diabetes  He presents for his follow-up diabetic visit. He has type 2 diabetes mellitus. His disease course has been worsening. There are no hypoglycemic associated symptoms. There are no hypoglycemic complications. Current diabetic treatments: Jardiance 10mg daily and Mounjaro 12.5mg under the skin once weekly. He is compliant with treatment all of the time (Patient states he was not completely compliant to his medications, however 3-6 months ago his blood sugars were consistantly high, and since then he has been compliant.). Diabetic current diet: Patient states he is working towards eating smaller portions. (Patient states when he has been testing his blood sugars since starting Mounjaro they have ranged from 110-120mg/dL. Patient has not experienced any blood sugars <70mg/dL or >180mg/dL.) An ACE inhibitor/angiotensin II receptor blocker is not being taken. He sees a podiatrist.    Objective     Labs  Lab Results   Component Value Date    BILITOT 0.5 02/24/2024    CALCIUM 9.2 02/24/2024    CO2 23 02/24/2024     02/24/2024    CREATININE 0.93 02/24/2024    GLUCOSE 122 (H) 02/24/2024    ALKPHOS 87 02/24/2024    K 4.6 02/24/2024    PROT 7.0 02/24/2024     02/24/2024    AST 25 02/24/2024    ALT 46 02/24/2024    BUN 16 02/24/2024    ANIONGAP 14 02/24/2024    ALBUMIN 4.3 02/24/2024    LIPASE 61 09/20/2022    GFRMALE >90 09/02/2023     Lab Results   Component Value Date    TRIG 510 (H) 02/24/2024    CHOL 172 02/24/2024    LDLCALC  02/24/2024      Comment:      The calculation of LDL and VLDL are inaccurate when the Triglycerides are greater than 400 mg/dL or when the patient is non-fasting. If LDL measurement is necessary contact the testing laboratory for an alternative LDL assay.                                  Near   Borderline      AGE       Desirable  Optimal    High     High     Very High     0-19 Y     0 - 109     ---    110-129   >/= 130     ----    20-24 Y     0 - 119     ---    120-159   >/= 160     ----      >24 Y     0 -  99   100-129  130-159   160-189     >/=190      HDL 27.2 2024     Lab Results   Component Value Date    HGBA1C 5.8 (H) 2024     Current Outpatient Medications on File Prior to Visit   Medication Sig Dispense Refill    cyclobenzaprine (Flexeril) 10 mg tablet Take 1 tablet (10 mg) by mouth 3 times a day as needed for muscle spasms. 60 tablet 1    tadalafil (Cialis) 20 mg tablet Take 1 tablet (20 mg) by mouth once daily as needed for erectile dysfunction. 15 tablet 0    [DISCONTINUED] empagliflozin (Jardiance) 10 mg Take 1 tablet (10 mg) by mouth once daily. 30 tablet 3    [DISCONTINUED] tirzepatide (Mounjaro) 12.5 mg/0.5 mL pen injector Inject 12.5 mg under the skin 1 (one) time per week. 2 mL 3     No current facility-administered medications on file prior to visit.      Assessment/Plan   Diabetes Education  Rule of 15: eating ~15 g of carbs when BG less than 80 (half cup juice, 3-4 glucose tabs).  Recognize symptoms of high and low blood sugar.   Eat a realistic healthy diet consisting of fruits, vegetables, fiber, protein food choices on a regular basis and be aware of portion/serving sizes. Reduce carbohydrate consumption and always consume with protein and fat. Avoid foods high in saturated/trans fat, high salt content, and sweets and beverages with added sugars.  Limit alcohol consumption; alcohol may affect your blood sugar and cause hypoglycemia.   Stay active and incorporate ~30 mins of exercise into your daily routine to manage your weight and increase the body's acceptance of insulin.    PATIENT GOALS   Fasting B - 130 mg/dL 2-HR Postprandial BG:   Less than 180 mg/dL A1c:   Less than 7.0 %     Mounjaro Education:   Counseled patient on MOA, expectations, side effects, duration of therapy,  contraindications, administration, and monitoring parameters  Answered all patient questions and concerns  Counseled patient on Mounjaro MOA, expectations, side effects, duration of therapy, administration, and monitoring parameters.  Provided detailed dosing and administration counseling to ensure proper technique.   Reviewed Mounjaro titration schedule, starting with 2.5 mg once weekly to a goal of 15 mg once weekly if tolerated  Counseled patient on the benefits of GLP-1ra glycemic control and weight loss  Reviewed storage requirements of Mounjaro when not in use, and when to administer the medication if a dose is missed.  Advised patient that they may experience improved satiety after meals and portion sizes of meals may be reduced as doses of Mounjaro increase.  Jardiance Education:   Counseled patient on Jardiance MOA, expectations, side effects, duration of therapy, administration, and monitoring parameters.  Reviewed the benefits of SGLT-2i therapy, such as glycemic control and kidney and CV protection.  Advised patient to practice proper  hygiene to reduce risk of UTIs or yeast infections.  Advised patient to maintain adequate fluid intake to remain hydrated while on SGLT2i therapy.  Answered all patient questions and concerns.    Patient Goals  Patient states that he has been trying to eat smaller portions, more often, which has allowed him to help lose weight.  Patient would like to not be on insulin for treatment of diabetes. Discussed that Mounjaro is not insulin, and clinically is a very good option to reduce A1c and help with weight loss.    Problem List Items Addressed This Visit       Type 2 diabetes mellitus with polyneuropathy (Multi) - Primary     Patients diabetes is well controlled as evidenced by his most recent A1c of 5.8% on 2/24/2024. This is a huge increase from last A1c. Congratulated patient on this decrease!  CONTINUE Mounjaro to 12.5mg under the skin once weekly. Will send a refill  to [facility] to be mailed to the patient  Patient was having side effects with the Mounjaro and Metformin combination, but after stopping Metformin, patient has not had any side effects.  CONTINUE Jardiance 10mg daily. Patient is tolerating medication well. Will send refill to [facility] for mail order.  Continue taking blood sugars 1-2 times weekly. Patients blood sugars have been well controlled since starting back on medications.  Continue working towards healthy diet and lifestyle.  Patient states he has been eating more fresh fruit, but has seen a small spike in his sugars. Will work on portion sizes with fruit.  Patient is interested in a diabetes class. Encouraged patient to reach out to Dr. Kurtz to see if he has any recommendations.         Relevant Medications    tirzepatide (Mounjaro) 12.5 mg/0.5 mL pen injector (Start on 6/30/2024)    empagliflozin (Jardiance) 10 mg    Other Relevant Orders    Follow Up In [facility] Pharmacy       Follow up with the Clinical Pharmacy Team on 9/25/2024 at 4:30PM.    Continue all meds under the continuation of care with the referring provider and clinical pharmacy team.    Please reach out to the Clinical Pharmacy Team if there are any further questions.     Verbal consent to manage patient's drug therapy was obtained from patient. They were informed they may decline to participate or withdraw from participation in pharmacy services at any time.    Maryann Bryant, PharmD  [phone]

## 2024-06-26 NOTE — ASSESSMENT & PLAN NOTE
Patients diabetes is well controlled as evidenced by his most recent A1c of 5.8% on 2/24/2024. This is a huge increase from last A1c. Congratulated patient on this decrease!  CONTINUE Mounjaro to 12.5mg under the skin once weekly. Will send a refill to [facility] to be mailed to the patient  Patient was having side effects with the Mounjaro and Metformin combination, but after stopping Metformin, patient has not had any side effects.  CONTINUE Jardiance 10mg daily. Patient is tolerating medication well. Will send refill to [facility] for mail order.  Continue taking blood sugars 1-2 times weekly. Patients blood sugars have been well controlled since starting back on medications.  Continue working towards healthy diet and lifestyle.  Patient states he has been eating more fresh fruit, but has seen a small spike in his sugars. Will work on portion sizes with fruit.  Patient is interested in a diabetes class. Encouraged patient to reach out to Dr. Kurtz to see if he has any recommendations.

## 2024-07-16 PROCEDURE — RXMED WILLOW AMBULATORY MEDICATION CHARGE

## 2024-07-17 ENCOUNTER — PHARMACY VISIT (OUTPATIENT)
Dept: PHARMACY | Facility: CLINIC | Age: 44
End: 2024-07-17
Payer: COMMERCIAL

## 2024-07-22 ENCOUNTER — APPOINTMENT (OUTPATIENT)
Dept: PRIMARY CARE | Facility: CLINIC | Age: 44
End: 2024-07-22
Payer: COMMERCIAL

## 2024-07-26 DIAGNOSIS — E78.1 HYPERTRIGLYCERIDEMIA: ICD-10-CM

## 2024-07-26 DIAGNOSIS — R73.9 HYPERGLYCEMIA: Primary | ICD-10-CM

## 2024-07-26 RX ORDER — ATORVASTATIN CALCIUM 10 MG/1
10 TABLET, FILM COATED ORAL DAILY
Qty: 30 TABLET | Refills: 0 | Status: SHIPPED | OUTPATIENT
Start: 2024-07-26

## 2024-08-13 PROCEDURE — RXMED WILLOW AMBULATORY MEDICATION CHARGE

## 2024-08-15 ENCOUNTER — PHARMACY VISIT (OUTPATIENT)
Dept: PHARMACY | Facility: CLINIC | Age: 44
End: 2024-08-15
Payer: COMMERCIAL

## 2024-08-25 DIAGNOSIS — E78.1 HYPERTRIGLYCERIDEMIA: ICD-10-CM

## 2024-08-26 RX ORDER — ATORVASTATIN CALCIUM 10 MG/1
10 TABLET, FILM COATED ORAL DAILY
Qty: 30 TABLET | Refills: 0 | Status: SHIPPED | OUTPATIENT
Start: 2024-08-26

## 2024-09-10 PROCEDURE — RXMED WILLOW AMBULATORY MEDICATION CHARGE

## 2024-09-12 ENCOUNTER — PHARMACY VISIT (OUTPATIENT)
Dept: PHARMACY | Facility: CLINIC | Age: 44
End: 2024-09-12
Payer: COMMERCIAL

## 2024-09-19 DIAGNOSIS — E78.1 HYPERTRIGLYCERIDEMIA: ICD-10-CM

## 2024-09-19 RX ORDER — ATORVASTATIN CALCIUM 10 MG/1
10 TABLET, FILM COATED ORAL DAILY
Qty: 30 TABLET | Refills: 0 | Status: SHIPPED | OUTPATIENT
Start: 2024-09-19

## 2024-09-25 ENCOUNTER — APPOINTMENT (OUTPATIENT)
Dept: PHARMACY | Facility: HOSPITAL | Age: 44
End: 2024-09-25
Payer: COMMERCIAL

## 2024-09-25 DIAGNOSIS — E11.42 TYPE 2 DIABETES MELLITUS WITH POLYNEUROPATHY: ICD-10-CM

## 2024-10-03 DIAGNOSIS — E78.1 HYPERTRIGLYCERIDEMIA: ICD-10-CM

## 2024-10-03 PROCEDURE — RXMED WILLOW AMBULATORY MEDICATION CHARGE

## 2024-10-03 RX ORDER — ATORVASTATIN CALCIUM 10 MG/1
10 TABLET, FILM COATED ORAL DAILY
Qty: 90 TABLET | Refills: 1 | OUTPATIENT
Start: 2024-10-03

## 2024-10-04 ENCOUNTER — PHARMACY VISIT (OUTPATIENT)
Dept: PHARMACY | Facility: CLINIC | Age: 44
End: 2024-10-04
Payer: COMMERCIAL

## 2024-10-04 RX ORDER — ATORVASTATIN CALCIUM 10 MG/1
10 TABLET, FILM COATED ORAL DAILY
Qty: 7 TABLET | Refills: 0 | Status: SHIPPED | OUTPATIENT
Start: 2024-10-04

## 2024-10-05 ENCOUNTER — PHARMACY VISIT (OUTPATIENT)
Dept: PHARMACY | Facility: CLINIC | Age: 44
End: 2024-10-05

## 2024-10-10 ENCOUNTER — APPOINTMENT (OUTPATIENT)
Dept: PRIMARY CARE | Facility: CLINIC | Age: 44
End: 2024-10-10
Payer: COMMERCIAL

## 2024-10-10 VITALS
HEART RATE: 87 BPM | HEIGHT: 70 IN | DIASTOLIC BLOOD PRESSURE: 68 MMHG | SYSTOLIC BLOOD PRESSURE: 110 MMHG | BODY MASS INDEX: 45.1 KG/M2 | TEMPERATURE: 97 F | RESPIRATION RATE: 16 BRPM | WEIGHT: 315 LBS | OXYGEN SATURATION: 96 %

## 2024-10-10 DIAGNOSIS — E78.1 HYPERTRIGLYCERIDEMIA: Primary | ICD-10-CM

## 2024-10-10 DIAGNOSIS — E66.01 MORBID OBESITY (MULTI): ICD-10-CM

## 2024-10-10 DIAGNOSIS — N52.9 ERECTILE DYSFUNCTION, UNSPECIFIED ERECTILE DYSFUNCTION TYPE: ICD-10-CM

## 2024-10-10 DIAGNOSIS — E11.42 TYPE 2 DIABETES MELLITUS WITH POLYNEUROPATHY: ICD-10-CM

## 2024-10-10 PROCEDURE — 83036 HEMOGLOBIN GLYCOSYLATED A1C: CPT | Performed by: NURSE PRACTITIONER

## 2024-10-10 PROCEDURE — 99214 OFFICE O/P EST MOD 30 MIN: CPT | Performed by: NURSE PRACTITIONER

## 2024-10-10 RX ORDER — SILDENAFIL 100 MG/1
100 TABLET, FILM COATED ORAL DAILY PRN
Qty: 12 TABLET | Refills: 0 | Status: SHIPPED | OUTPATIENT
Start: 2024-10-10 | End: 2025-10-10

## 2024-10-10 ASSESSMENT — ENCOUNTER SYMPTOMS
FATIGUE: 0
WEAKNESS: 0
EYE REDNESS: 0
DIARRHEA: 0
NAUSEA: 0
VOMITING: 0
POLYDIPSIA: 0
CONSTIPATION: 0
SHORTNESS OF BREATH: 0
CHILLS: 0
SLEEP DISTURBANCE: 0
POLYPHAGIA: 0
WHEEZING: 0
BACK PAIN: 0
COUGH: 0
NERVOUS/ANXIOUS: 0
ABDOMINAL PAIN: 0
FLANK PAIN: 0
HEADACHES: 0
DIZZINESS: 0
SINUS PAIN: 0
DYSPHORIC MOOD: 0
SORE THROAT: 0
SINUS PRESSURE: 0
PALPITATIONS: 0
DYSURIA: 0
FEVER: 0
LIGHT-HEADEDNESS: 0

## 2024-10-10 NOTE — PROGRESS NOTES
Subjective   Patient ID: Taryn Ro is a 44 y.o. male who presents for Med Refill.    Patient is being seen today for medication refills, Patient denies any concerns during today's visit. Patient would like to get a dose increase on the Cialis.     Med Refill  Pertinent negatives include no abdominal pain, chest pain, chills, congestion, coughing, fatigue, fever, headaches, nausea, rash, sore throat, vomiting or weakness.      44-year-old male (PMH: Hyper triglyceridemia, ED, depression, type II DM, obesity) presents today for medication refills.  He has been feeling well. He states that his blood sugars running a bit higher the last few month. He has been working on diet and exercise. He is also wanting to switch back to Viagra. He felt that it worked better for him than the Cialis is.   Podiatry scheduled 1/2025  Eye check- due    Review of Systems   Constitutional:  Negative for chills, fatigue and fever.   HENT:  Negative for congestion, ear pain, sinus pressure, sinus pain and sore throat.    Eyes:  Negative for redness and visual disturbance.   Respiratory:  Negative for cough, shortness of breath and wheezing.    Cardiovascular:  Negative for chest pain, palpitations and leg swelling.   Gastrointestinal:  Negative for abdominal pain, constipation, diarrhea, nausea and vomiting.   Endocrine: Negative for polydipsia, polyphagia and polyuria.   Genitourinary:  Negative for dysuria, flank pain, genital sores, penile discharge, scrotal swelling, testicular pain and urgency.   Musculoskeletal:  Negative for back pain.   Skin:  Negative for rash.   Neurological:  Negative for dizziness, weakness, light-headedness and headaches.   Psychiatric/Behavioral:  Negative for dysphoric mood and sleep disturbance. The patient is not nervous/anxious.        Objective   /68 (BP Location: Left arm, Patient Position: Sitting, BP Cuff Size: Large adult)   Pulse 87   Temp 36.1 °C (97 °F) (Temporal)   Resp 16   Ht  "1.778 m (5' 10\")   Wt (!) 162 kg (358 lb)   SpO2 96%   BMI 51.37 kg/m²     Physical Exam  Vitals and nursing note reviewed.   Constitutional:       Appearance: Normal appearance. He is obese.   Cardiovascular:      Rate and Rhythm: Normal rate and regular rhythm.      Heart sounds: Normal heart sounds.   Pulmonary:      Effort: Pulmonary effort is normal.      Breath sounds: Normal breath sounds.   Musculoskeletal:      Right lower leg: No edema.      Left lower leg: No edema.   Skin:     General: Skin is warm and dry.   Neurological:      Mental Status: He is alert.   Psychiatric:         Mood and Affect: Mood normal.         Behavior: Behavior normal.         Assessment/Plan   Problem List Items Addressed This Visit             ICD-10-CM    Hypertriglyceridemia - Primary E78.1    Relevant Orders    Lipid panel    Erectile dysfunction N52.9    Relevant Medications    sildenafil (Viagra) 100 mg tablet    Morbid obesity (Multi) E66.01    Type 2 diabetes mellitus with polyneuropathy E11.42    Relevant Medications    empagliflozin (Jardiance) 10 mg    Other Relevant Orders    Comprehensive Metabolic Panel    CBC and Auto Differential    Tsh With Reflex To Free T4 If Abnormal    POCT glycosylated hemoglobin (Hb A1C) manually resulted (Completed)    BMI 50.0-59.9, adult (Multi) Z68.43   Ha1c 6.3% today. Discussed diabetic diet and regular exercise.   Stop Cialis, will restart Viagra.  Continue with other current medications for chronic health conditions.   Check labs.   Follow up in 6 months for recheck, or sooner with any additional concerns.        "

## 2024-10-11 LAB — POC HEMOGLOBIN A1C: 6.3 % (ref 4.2–6.5)

## 2024-10-31 PROCEDURE — RXMED WILLOW AMBULATORY MEDICATION CHARGE

## 2024-10-31 RX ORDER — TIRZEPATIDE 12.5 MG/.5ML
12.5 INJECTION, SOLUTION SUBCUTANEOUS
Qty: 2 ML | Refills: 0 | Status: SHIPPED | OUTPATIENT
Start: 2024-11-03

## 2024-11-05 ENCOUNTER — PHARMACY VISIT (OUTPATIENT)
Dept: PHARMACY | Facility: CLINIC | Age: 44
End: 2024-11-05
Payer: COMMERCIAL

## 2024-11-06 ENCOUNTER — APPOINTMENT (OUTPATIENT)
Dept: PHARMACY | Facility: HOSPITAL | Age: 44
End: 2024-11-06
Payer: COMMERCIAL

## 2024-11-07 ENCOUNTER — TELEMEDICINE (OUTPATIENT)
Dept: PHARMACY | Facility: HOSPITAL | Age: 44
End: 2024-11-07
Payer: COMMERCIAL

## 2024-11-07 DIAGNOSIS — E11.42 TYPE 2 DIABETES MELLITUS WITH POLYNEUROPATHY: ICD-10-CM

## 2024-11-07 NOTE — PROGRESS NOTES
Clinical Pharmacy Visit    Subjective   Patient ID: Taryn Ro is a 44 y.o. male who presents for Diabetes.    Referring Provider: oBo Kurtz DO      Patient Assistance for Mounjaro and Jardiance approved through 11/10/2024. Will have to be renewed prior to that date to prevent lapse in coverage. Medication(s) [facility] cost [facility]    Diabetes  He presents for his follow-up diabetic visit. He has type 2 diabetes mellitus. His disease course has been worsening. There are no hypoglycemic associated symptoms. There are no hypoglycemic complications. Current diabetic treatments: Jardiance 10mg daily and Mounjaro 12.5mg under the skin once weekly. He is compliant with treatment all of the time (Patient states he was not completely compliant to his medications, however 3-6 months ago his blood sugars were consistantly high, and since then he has been compliant.). Diabetic current diet: Patient states he is working towards eating smaller portions. (Patient states there have been more stressors at home/work, which have elevated his blood sugars slightly. States things have started to settle back down and believes his sugars will start to come back down as well.) An ACE inhibitor/angiotensin II receptor blocker is not being taken. He sees a podiatrist.    Objective     Labs  Lab Results   Component Value Date    BILITOT 0.5 02/24/2024    CALCIUM 9.2 02/24/2024    CO2 23 02/24/2024     02/24/2024    CREATININE 0.93 02/24/2024    GLUCOSE 122 (H) 02/24/2024    ALKPHOS 87 02/24/2024    K 4.6 02/24/2024    PROT 7.0 02/24/2024     02/24/2024    AST 25 02/24/2024    ALT 46 02/24/2024    BUN 16 02/24/2024    ANIONGAP 14 02/24/2024    ALBUMIN 4.3 02/24/2024    LIPASE 61 09/20/2022    GFRMALE >90 09/02/2023     Lab Results   Component Value Date    TRIG 510 (H) 02/24/2024    CHOL 172 02/24/2024    LDLCALC  02/24/2024      Comment:      The calculation of LDL and VLDL  are inaccurate when the Triglycerides are greater than 400 mg/dL or when the patient is non-fasting. If LDL measurement is necessary contact the testing laboratory for an alternative LDL assay.                                  Near   Borderline      AGE      Desirable  Optimal    High     High     Very High     0-19 Y     0 - 109     ---    110-129   >/= 130     ----    20-24 Y     0 - 119     ---    120-159   >/= 160     ----      >24 Y     0 -  99   100-129  130-159   160-189     >/=190      HDL 27.2 02/24/2024     Lab Results   Component Value Date    HGBA1C 6.3 10/11/2024     Current Outpatient Medications on File Prior to Visit   Medication Sig Dispense Refill    atorvastatin (Lipitor) 10 mg tablet Take 1 tablet (10 mg) by mouth once daily. 7 tablet 0    cyclobenzaprine (Flexeril) 10 mg tablet Take 1 tablet (10 mg) by mouth 3 times a day as needed for muscle spasms. 60 tablet 1    sildenafil (Viagra) 100 mg tablet Take 1 tablet (100 mg) by mouth once daily as needed for erectile dysfunction. 12 tablet 0    tirzepatide (Mounjaro) 12.5 mg/0.5 mL pen injector Inject 12.5 mg under the skin 1 (one) time per week. 2 mL 0    [DISCONTINUED] empagliflozin (Jardiance) 10 mg Take 1 tablet (10 mg) by mouth once daily. 30 tablet 0     No current facility-administered medications on file prior to visit.      Assessment/Plan   Patient Goals  Patient states that he has been trying to eat smaller portions, more often, which has allowed him to help lose weight.  Patient would like to not be on insulin for treatment of diabetes. Discussed that Mounjaro is not insulin, and clinically is a very good option to reduce A1c and help with weight loss.    Problem List Items Addressed This Visit       Type 2 diabetes mellitus with polyneuropathy     Patient's goal A1c is < 7%.  Is pt at goal? Yes, 6.3% (10/11/2024)  Patient's SMBGs are slightly above goal.     Rationale for plan: Continue current regimen. Patient states there have been  more stressors in his life recently, but things have gotten better and he believes his sugars will start to come back down.    Medication Changes:  CONTINUE:  Mounjaro 12.5mg under the skin once weekly. Will send to [facility] for mail order.  Jardiance 10mg daily. Will send to [facility] for mail order.    Future Considerations:  Continue titrating Mounjaro as needed    Diabetes Education  Rule of 15: eating ~15 g of carbs when BG less than 80 (half cup juice, 3-4 glucose tabs).  Recognize symptoms of high and low blood sugar.   Eat a realistic healthy diet consisting of fruits, vegetables, fiber, protein food choices on a regular basis and be aware of portion/serving sizes. Reduce carbohydrate consumption and always consume with protein and fat. Avoid foods high in saturated/trans fat, high salt content, and sweets and beverages with added sugars.  Limit alcohol consumption; alcohol may affect your blood sugar and cause hypoglycemia.   Stay active and incorporate ~30 mins of exercise into your daily routine to manage your weight and increase the body's acceptance of insulin.    PATIENT GOALS   Fasting B - 130 mg/dL 2-HR Postprandial BG:   Less than 180 mg/dL A1c:   Less than 7.0 %     Mounjaro Education:  Counseled patient on Mounjaro MOA, expectations, side effects, duration of therapy, administration, and monitoring parameters.  Counseled patient on the benefits of GLP-1ra, such as cardiovascular risk reduction, glycemic control, and weight loss potential.  Provided detailed dosing and administration counseling to ensure proper technique.   Reviewed Mounjaro titration schedule, starting with 2.5 mg once weekly to 5 mg, 7.5mg, 10mg, 12.5mg and if tolerated 15 mg.  Reviewed storage requirements of Mounjaro when not in use, and when to administer the medication if a dose is missed.  Discussed risks of GLP1ra including risk of pancreatitis, MTC and worsening of DR  Advised patient that they  may experience improved satiety after meals and portion sizes of meals may be reduced as doses of Mounjaro increase.    Empagliflozin (Jardiance) Education:  Counseled patient on Empagliflozin (Jardiance) MOA, expectations, side effects, duration of therapy, administration, and monitoring parameters.  Reviewed the benefits of SGLT-2i therapy, such as glycemic control and kidney and CV protection.  Advised patient to practice proper  hygiene to reduce risk of UTIs or yeast infections.  Advised patient to maintain adequate fluid intake to remain hydrated while on SGLT2i therapy.  Answered all patient questions and concerns.         Relevant Medications    empagliflozin (Jardiance) 10 mg    Other Relevant Orders    Referral to Clinical Pharmacy     Follow up with the Clinical Pharmacy Team on 1/30/2025 at 5:00PM.    Continue all meds under the continuation of care with the referring provider and clinical pharmacy team.    Please reach out to the Clinical Pharmacy Team if there are any further questions.     Verbal consent to manage patient's drug therapy was obtained from patient. They were informed they may decline to participate or withdraw from participation in pharmacy services at any time.    Maryann Bryant, PharmD  [phone]

## 2024-11-07 NOTE — ASSESSMENT & PLAN NOTE
Patient's goal A1c is < 7%.  Is pt at goal? Yes, 6.3% (10/11/2024)  Patient's SMBGs are slightly above goal.     Rationale for plan: Continue current regimen. Patient states there have been more stressors in his life recently, but things have gotten better and he believes his sugars will start to come back down.    Medication Changes:  CONTINUE:  Mounjaro 12.5mg under the skin once weekly. Will send to [facility] for mail order.  Jardiance 10mg daily. Will send to [facility] for mail order.    Future Considerations:  Continue titrating Mounjaro as needed    Diabetes Education  Rule of 15: eating ~15 g of carbs when BG less than 80 (half cup juice, 3-4 glucose tabs).  Recognize symptoms of high and low blood sugar.   Eat a realistic healthy diet consisting of fruits, vegetables, fiber, protein food choices on a regular basis and be aware of portion/serving sizes. Reduce carbohydrate consumption and always consume with protein and fat. Avoid foods high in saturated/trans fat, high salt content, and sweets and beverages with added sugars.  Limit alcohol consumption; alcohol may affect your blood sugar and cause hypoglycemia.   Stay active and incorporate ~30 mins of exercise into your daily routine to manage your weight and increase the body's acceptance of insulin.    PATIENT GOALS   Fasting B - 130 mg/dL 2-HR Postprandial BG:   Less than 180 mg/dL A1c:   Less than 7.0 %     Mounjaro Education:  Counseled patient on Mounjaro MOA, expectations, side effects, duration of therapy, administration, and monitoring parameters.  Counseled patient on the benefits of GLP-1ra, such as cardiovascular risk reduction, glycemic control, and weight loss potential.  Provided detailed dosing and administration counseling to ensure proper technique.   Reviewed Mounjaro titration schedule, starting with 2.5 mg once weekly to 5 mg, 7.5mg, 10mg, 12.5mg and if tolerated 15 mg.  Reviewed storage requirements of  Mounjaro when not in use, and when to administer the medication if a dose is missed.  Discussed risks of GLP1ra including risk of pancreatitis, MTC and worsening of DR  Advised patient that they may experience improved satiety after meals and portion sizes of meals may be reduced as doses of Mounjaro increase.    Empagliflozin (Jardiance) Education:  Counseled patient on Empagliflozin (Jardiance) MOA, expectations, side effects, duration of therapy, administration, and monitoring parameters.  Reviewed the benefits of SGLT-2i therapy, such as glycemic control and kidney and CV protection.  Advised patient to practice proper  hygiene to reduce risk of UTIs or yeast infections.  Advised patient to maintain adequate fluid intake to remain hydrated while on SGLT2i therapy.  Answered all patient questions and concerns.

## 2024-11-13 PROCEDURE — RXMED WILLOW AMBULATORY MEDICATION CHARGE

## 2024-11-19 ENCOUNTER — PHARMACY VISIT (OUTPATIENT)
Dept: PHARMACY | Facility: CLINIC | Age: 44
End: 2024-11-19
Payer: COMMERCIAL

## 2024-11-28 DIAGNOSIS — E11.42 TYPE 2 DIABETES MELLITUS WITH POLYNEUROPATHY: ICD-10-CM

## 2024-11-29 ENCOUNTER — PHARMACY VISIT (OUTPATIENT)
Dept: PHARMACY | Facility: CLINIC | Age: 44
End: 2024-11-29
Payer: COMMERCIAL

## 2024-11-29 PROCEDURE — RXMED WILLOW AMBULATORY MEDICATION CHARGE

## 2024-11-29 RX ORDER — TIRZEPATIDE 12.5 MG/.5ML
12.5 INJECTION, SOLUTION SUBCUTANEOUS
Qty: 2 ML | Refills: 2 | Status: SHIPPED | OUTPATIENT
Start: 2024-12-01

## 2024-12-24 PROCEDURE — RXMED WILLOW AMBULATORY MEDICATION CHARGE

## 2024-12-27 ENCOUNTER — PHARMACY VISIT (OUTPATIENT)
Dept: PHARMACY | Facility: CLINIC | Age: 44
End: 2024-12-27
Payer: COMMERCIAL

## 2025-01-04 ENCOUNTER — TELEPHONE (OUTPATIENT)
Dept: PRIMARY CARE | Facility: CLINIC | Age: 45
End: 2025-01-04
Payer: COMMERCIAL

## 2025-01-04 NOTE — TELEPHONE ENCOUNTER
Patient of Dr. Jerardo LEAL submitted for Mounjaro 12.5mg    It is approved through [employer] from 12/05/2024 until 07/03/2025  Case id [id number]

## 2025-01-21 PROCEDURE — RXMED WILLOW AMBULATORY MEDICATION CHARGE

## 2025-01-22 ENCOUNTER — PHARMACY VISIT (OUTPATIENT)
Dept: PHARMACY | Facility: CLINIC | Age: 45
End: 2025-01-22
Payer: COMMERCIAL

## 2025-01-30 ENCOUNTER — APPOINTMENT (OUTPATIENT)
Dept: PHARMACY | Facility: HOSPITAL | Age: 45
End: 2025-01-30
Payer: COMMERCIAL

## 2025-01-30 DIAGNOSIS — E11.42 TYPE 2 DIABETES MELLITUS WITH POLYNEUROPATHY: ICD-10-CM

## 2025-01-30 PROCEDURE — RXMED WILLOW AMBULATORY MEDICATION CHARGE

## 2025-01-30 RX ORDER — TIRZEPATIDE 15 MG/.5ML
15 INJECTION, SOLUTION SUBCUTANEOUS WEEKLY
Qty: 2 ML | Refills: 3 | Status: SHIPPED | OUTPATIENT
Start: 2025-01-30

## 2025-01-30 NOTE — ASSESSMENT & PLAN NOTE
Patient's goal A1c is < 7%.  Is pt at goal? Yes, 6.3% (10/11/2024)  Patient's SMBGs are in normal range, but patient states have slightly increased.    Rationale for plan: Patient is tolerating current regimen, but states his weight loss has hit a plateau and his sugars have started to increase slightly. Will increase Mounjaro to help with continued weight loss and sugar control. Patient states his shoulder has also recently been bothering him, and discussed that increased pain can sometimes lead to increased FBG.    Medication Changes:  INCREASE:  Mounjaro to 15mg under the skin once weekly. Will send to [facility] for mail order.  CONTINUE  Jardiance 10mg daily. Will send to [facility] for mail order.    Future Considerations:  Continue titrating Mounjaro as needed    Diabetes Education  Rule of 15: eating ~15 g of carbs when BG less than 80 (half cup juice, 3-4 glucose tabs).  Recognize symptoms of high and low blood sugar.   Eat a realistic healthy diet consisting of fruits, vegetables, fiber, protein food choices on a regular basis and be aware of portion/serving sizes. Reduce carbohydrate consumption and always consume with protein and fat. Avoid foods high in saturated/trans fat, high salt content, and sweets and beverages with added sugars.  Limit alcohol consumption; alcohol may affect your blood sugar and cause hypoglycemia.   Stay active and incorporate ~30 mins of exercise into your daily routine to manage your weight and increase the body's acceptance of insulin.    PATIENT GOALS   Fasting B - 130 mg/dL 2-HR Postprandial BG:   Less than 180 mg/dL A1c:   Less than 7.0 %     Mounjaro Education:  Counseled patient on Mounjaro MOA, expectations, side effects, duration of therapy, administration, and monitoring parameters.  Counseled patient on the benefits of GLP-1ra, such as cardiovascular risk reduction, glycemic control, and weight loss potential.  Provided detailed dosing and  administration counseling to ensure proper technique.   Reviewed Mounjaro titration schedule, starting with 2.5 mg once weekly to 5 mg, 7.5mg, 10mg, 12.5mg and if tolerated 15 mg.  Reviewed storage requirements of Mounjaro when not in use, and when to administer the medication if a dose is missed.  Discussed risks of GLP1ra including risk of pancreatitis, MTC and worsening of DR  Advised patient that they may experience improved satiety after meals and portion sizes of meals may be reduced as doses of Mounjaro increase.    Empagliflozin (Jardiance) Education:  Counseled patient on Empagliflozin (Jardiance) MOA, expectations, side effects, duration of therapy, administration, and monitoring parameters.  Reviewed the benefits of SGLT-2i therapy, such as glycemic control and kidney and CV protection.  Advised patient to practice proper  hygiene to reduce risk of UTIs or yeast infections.  Advised patient to maintain adequate fluid intake to remain hydrated while on SGLT2i therapy.  Answered all patient questions and concerns.

## 2025-01-30 NOTE — PROGRESS NOTES
Clinical Pharmacy Visit    Subjective   Patient ID: Taryn Ro is a 44 y.o. male who presents for Diabetes.    Referring Provider: Boo Kurtz DO   Last PCP Visit: 10/10/2024  Next PCP Visit: 4/10/2025     Patient Assistance for Mounjaro and Jardiance approved through 11/13/2025. Will have to be renewed prior to that date to prevent lapse in coverage. Medication(s) will [facility] cost [facility]    Diabetes  He presents for his follow-up diabetic visit. He has type 2 diabetes mellitus. His disease course has been worsening. There are no hypoglycemic associated symptoms. There are no hypoglycemic complications. Current diabetic treatments: Jardiance 10mg daily and Mounjaro 12.5mg under the skin once weekly. He is compliant with treatment all of the time (Patient states he was not completely compliant to his medications, however 3-6 months ago his blood sugars were consistantly high, and since then he has been compliant.). Diabetic current diet: Patient states he is working towards eating smaller portions. (Patient states there have been more stressors at home/work, which have elevated his blood sugars slightly. States things have started to settle back down and believes his sugars will start to come back down as well.) An ACE inhibitor/angiotensin II receptor blocker is not being taken. He sees a podiatrist.    Objective     Labs  Lab Results   Component Value Date    BILITOT 0.5 02/24/2024    CALCIUM 9.2 02/24/2024    CO2 23 02/24/2024     02/24/2024    CREATININE 0.93 02/24/2024    GLUCOSE 122 (H) 02/24/2024    ALKPHOS 87 02/24/2024    K 4.6 02/24/2024    PROT 7.0 02/24/2024     02/24/2024    AST 25 02/24/2024    ALT 46 02/24/2024    BUN 16 02/24/2024    ANIONGAP 14 02/24/2024    ALBUMIN 4.3 02/24/2024    LIPASE 61 09/20/2022    GFRMALE >90 09/02/2023     Lab Results   Component Value Date    TRIG 510 (H) 02/24/2024    CHOL 172 02/24/2024    LDLCALC   02/24/2024      Comment:      The calculation of LDL and VLDL are inaccurate when the Triglycerides are greater than 400 mg/dL or when the patient is non-fasting. If LDL measurement is necessary contact the testing laboratory for an alternative LDL assay.                                  Near   Borderline      AGE      Desirable  Optimal    High     High     Very High     0-19 Y     0 - 109     ---    110-129   >/= 130     ----    20-24 Y     0 - 119     ---    120-159   >/= 160     ----      >24 Y     0 -  99   100-129  130-159   160-189     >/=190      HDL 27.2 02/24/2024     Lab Results   Component Value Date    HGBA1C 6.3 10/11/2024     Current Outpatient Medications on File Prior to Visit   Medication Sig Dispense Refill    atorvastatin (Lipitor) 10 mg tablet Take 1 tablet (10 mg) by mouth once daily. 7 tablet 0    cyclobenzaprine (Flexeril) 10 mg tablet Take 1 tablet (10 mg) by mouth 3 times a day as needed for muscle spasms. 60 tablet 1    sildenafil (Viagra) 100 mg tablet Take 1 tablet (100 mg) by mouth once daily as needed for erectile dysfunction. 12 tablet 0    [DISCONTINUED] empagliflozin (Jardiance) 10 mg Take 1 tablet (10 mg) by mouth once daily. 90 tablet 3    [DISCONTINUED] tirzepatide (Mounjaro) 12.5 mg/0.5 mL pen injector Inject 12.5 mg under the skin 1 (one) time per week. 2 mL 2     No current facility-administered medications on file prior to visit.      Assessment/Plan   Patient Goals  Patient states that he has been trying to eat smaller portions, more often, which has allowed him to help lose weight.  Patient would like to not be on insulin for treatment of diabetes. Discussed that Mounjaro is not insulin, and clinically is a very good option to reduce A1c and help with weight loss.    Problem List Items Addressed This Visit       Type 2 diabetes mellitus with polyneuropathy     Patient's goal A1c is < 7%.  Is pt at goal? Yes, 6.3% (10/11/2024)  Patient's SMBGs are in normal range, but  patient states have slightly increased.    Rationale for plan: Patient is tolerating current regimen, but states his weight loss has hit a plateau and his sugars have started to increase slightly. Will increase Mounjaro to help with continued weight loss and sugar control. Patient states his shoulder has also recently been bothering him, and discussed that increased pain can sometimes lead to increased FBG.    Medication Changes:  INCREASE:  Mounjaro to 15mg under the skin once weekly. Will send to [facility] for mail order.  CONTINUE  Jardiance 10mg daily. Will send to [facility] for mail order.    Future Considerations:  Continue titrating Mounjaro as needed    Diabetes Education  Rule of 15: eating ~15 g of carbs when BG less than 80 (half cup juice, 3-4 glucose tabs).  Recognize symptoms of high and low blood sugar.   Eat a realistic healthy diet consisting of fruits, vegetables, fiber, protein food choices on a regular basis and be aware of portion/serving sizes. Reduce carbohydrate consumption and always consume with protein and fat. Avoid foods high in saturated/trans fat, high salt content, and sweets and beverages with added sugars.  Limit alcohol consumption; alcohol may affect your blood sugar and cause hypoglycemia.   Stay active and incorporate ~30 mins of exercise into your daily routine to manage your weight and increase the body's acceptance of insulin.    PATIENT GOALS   Fasting B - 130 mg/dL 2-HR Postprandial BG:   Less than 180 mg/dL A1c:   Less than 7.0 %     Mounjaro Education:  Counseled patient on Mounjaro MOA, expectations, side effects, duration of therapy, administration, and monitoring parameters.  Counseled patient on the benefits of GLP-1ra, such as cardiovascular risk reduction, glycemic control, and weight loss potential.  Provided detailed dosing and administration counseling to ensure proper technique.   Reviewed Mounjaro titration schedule, starting with  2.5 mg once weekly to 5 mg, 7.5mg, 10mg, 12.5mg and if tolerated 15 mg.  Reviewed storage requirements of Mounjaro when not in use, and when to administer the medication if a dose is missed.  Discussed risks of GLP1ra including risk of pancreatitis, MTC and worsening of DR  Advised patient that they may experience improved satiety after meals and portion sizes of meals may be reduced as doses of Mounjaro increase.    Empagliflozin (Jardiance) Education:  Counseled patient on Empagliflozin (Jardiance) MOA, expectations, side effects, duration of therapy, administration, and monitoring parameters.  Reviewed the benefits of SGLT-2i therapy, such as glycemic control and kidney and CV protection.  Advised patient to practice proper  hygiene to reduce risk of UTIs or yeast infections.  Advised patient to maintain adequate fluid intake to remain hydrated while on SGLT2i therapy.  Answered all patient questions and concerns.         Relevant Medications    tirzepatide (Mounjaro) 15 mg/0.5 mL pen injector    empagliflozin (Jardiance) 10 mg    Other Relevant Orders    Referral to Clinical Pharmacy     Follow up with the Clinical Pharmacy Team on 5/2/2025 at 8:20AM.    Continue all meds under the continuation of care with the referring provider and clinical pharmacy team.    Please reach out to the Clinical Pharmacy Team if there are any further questions.     Verbal consent to manage patient's drug therapy was obtained from patient. They were informed they may decline to participate or withdraw from participation in pharmacy services at any time.    Maryann Bryant, PharmD  [phone]

## 2025-02-01 ENCOUNTER — PHARMACY VISIT (OUTPATIENT)
Dept: PHARMACY | Facility: CLINIC | Age: 45
End: 2025-02-01
Payer: COMMERCIAL

## 2025-03-14 ENCOUNTER — OFFICE VISIT (OUTPATIENT)
Dept: PRIMARY CARE | Facility: CLINIC | Age: 45
End: 2025-03-14
Payer: COMMERCIAL

## 2025-03-14 VITALS
BODY MASS INDEX: 45.1 KG/M2 | HEART RATE: 96 BPM | WEIGHT: 315 LBS | DIASTOLIC BLOOD PRESSURE: 72 MMHG | HEIGHT: 70 IN | SYSTOLIC BLOOD PRESSURE: 124 MMHG | TEMPERATURE: 98 F | OXYGEN SATURATION: 97 % | RESPIRATION RATE: 19 BRPM

## 2025-03-14 DIAGNOSIS — J02.8 SORE THROAT (VIRAL): Primary | ICD-10-CM

## 2025-03-14 DIAGNOSIS — B97.89 SORE THROAT (VIRAL): Primary | ICD-10-CM

## 2025-03-14 DIAGNOSIS — J45.31 MILD PERSISTENT ASTHMA WITH ACUTE EXACERBATION (HHS-HCC): ICD-10-CM

## 2025-03-14 DIAGNOSIS — N52.9 ERECTILE DYSFUNCTION, UNSPECIFIED ERECTILE DYSFUNCTION TYPE: ICD-10-CM

## 2025-03-14 DIAGNOSIS — E11.42 TYPE 2 DIABETES MELLITUS WITH POLYNEUROPATHY: ICD-10-CM

## 2025-03-14 DIAGNOSIS — E66.01 MORBID OBESITY (MULTI): ICD-10-CM

## 2025-03-14 LAB
POC FINGERSTICK BLOOD GLUCOSE: 148 MG/DL (ref 70–100)
POC HEMOGLOBIN A1C: 6.6 % (ref 4.2–6.5)
POC RAPID INFLUENZA A: NEGATIVE
POC RAPID INFLUENZA B: NEGATIVE
POC RAPID STREP: NEGATIVE

## 2025-03-14 PROCEDURE — 82962 GLUCOSE BLOOD TEST: CPT | Performed by: FAMILY MEDICINE

## 2025-03-14 PROCEDURE — 83036 HEMOGLOBIN GLYCOSYLATED A1C: CPT | Performed by: FAMILY MEDICINE

## 2025-03-14 PROCEDURE — 99213 OFFICE O/P EST LOW 20 MIN: CPT | Performed by: FAMILY MEDICINE

## 2025-03-14 PROCEDURE — 87880 STREP A ASSAY W/OPTIC: CPT | Performed by: FAMILY MEDICINE

## 2025-03-14 PROCEDURE — 87804 INFLUENZA ASSAY W/OPTIC: CPT | Performed by: FAMILY MEDICINE

## 2025-03-14 RX ORDER — SILDENAFIL 100 MG/1
100 TABLET, FILM COATED ORAL DAILY PRN
Qty: 20 TABLET | Refills: 2 | Status: SHIPPED | OUTPATIENT
Start: 2025-03-14 | End: 2026-03-14

## 2025-03-14 RX ORDER — TIRZEPATIDE 15 MG/.5ML
15 INJECTION, SOLUTION SUBCUTANEOUS WEEKLY
Qty: 2 ML | Refills: 3 | Status: SHIPPED | OUTPATIENT
Start: 2025-03-14

## 2025-03-14 RX ORDER — AZITHROMYCIN 250 MG/1
TABLET, FILM COATED ORAL
Qty: 6 TABLET | Refills: 0 | Status: SHIPPED | OUTPATIENT
Start: 2025-03-14 | End: 2025-03-19

## 2025-03-14 ASSESSMENT — ENCOUNTER SYMPTOMS
SORE THROAT: 1
HOARSE VOICE: 0
TROUBLE SWALLOWING: 0
HEADACHES: 1
LOSS OF SENSATION IN FEET: 0
DEPRESSION: 0
DIARRHEA: 1
OCCASIONAL FEELINGS OF UNSTEADINESS: 0
SHORTNESS OF BREATH: 0
COUGH: 1

## 2025-03-14 ASSESSMENT — PATIENT HEALTH QUESTIONNAIRE - PHQ9
1. LITTLE INTEREST OR PLEASURE IN DOING THINGS: NOT AT ALL
SUM OF ALL RESPONSES TO PHQ9 QUESTIONS 1 AND 2: 0
2. FEELING DOWN, DEPRESSED OR HOPELESS: NOT AT ALL

## 2025-03-14 NOTE — PROGRESS NOTES
Subjective   Patient ID: Taryn Ro is a 45 y.o. male who presents for Sore Throat.    Patient denies any other symptoms or concerns today.    Sore Throat   This is a recurrent problem. Episode onset: 3 days ago. The problem has been gradually worsening. Neither side of throat is experiencing more pain than the other. There has been no fever. The pain is at a severity of 2/10. The pain is mild. Associated symptoms include congestion, coughing, diarrhea and headaches. Pertinent negatives include no abdominal pain, ear discharge, ear pain, hoarse voice, shortness of breath, stridor or trouble swallowing. Associated symptoms comments: Running nose. Exposure to: influenza B. He has tried cool liquids for the symptoms. The treatment provided no relief.        Review of Systems   Constitutional: Negative.  Negative for activity change, appetite change, fatigue and fever.   HENT:  Positive for congestion and sore throat. Negative for dental problem, ear discharge, ear pain, hoarse voice, mouth sores, rhinorrhea, sinus pressure, sinus pain, tinnitus and trouble swallowing.    Eyes:  Negative for photophobia, pain and visual disturbance.   Respiratory:  Positive for cough. Negative for chest tightness, shortness of breath and stridor.    Cardiovascular:  Negative for chest pain and palpitations.   Gastrointestinal:  Positive for diarrhea. Negative for abdominal distention, abdominal pain, blood in stool, constipation and rectal pain.   Endocrine: Negative for cold intolerance, heat intolerance, polydipsia, polyphagia and polyuria.   Genitourinary:  Negative for dysuria, flank pain, hematuria and urgency.   Musculoskeletal:  Negative for arthralgias, gait problem, myalgias and neck stiffness.   Skin:  Negative for color change and rash.   Allergic/Immunologic: Negative for environmental allergies and food allergies.   Neurological:  Positive for headaches. Negative for dizziness, seizures, syncope and speech difficulty.  "  Hematological:  Negative for adenopathy.   Psychiatric/Behavioral:  Negative for agitation, confusion, decreased concentration, dysphoric mood and sleep disturbance. The patient is not nervous/anxious.        Objective   /72 (BP Location: Right arm, Patient Position: Sitting, BP Cuff Size: Adult)   Pulse 96   Temp 36.7 °C (98 °F) (Skin)   Resp 19   Ht 1.778 m (5' 10\")   Wt (!) 160 kg (352 lb)   SpO2 97%   BMI 50.51 kg/m²     Physical Exam  Vitals reviewed.   Constitutional:       General: He is not in acute distress.     Appearance: He is obese. He is not ill-appearing or diaphoretic.   HENT:      Head: Normocephalic.      Right Ear: Tympanic membrane and external ear normal.      Left Ear: Tympanic membrane and external ear normal.      Nose: Nose normal. No congestion.      Mouth/Throat:      Pharynx: Posterior oropharyngeal erythema present.   Eyes:      General:         Right eye: No discharge.         Left eye: No discharge.      Extraocular Movements: Extraocular movements intact.      Conjunctiva/sclera: Conjunctivae normal.      Pupils: Pupils are equal, round, and reactive to light.   Cardiovascular:      Rate and Rhythm: Normal rate and regular rhythm.      Pulses: Normal pulses.      Heart sounds: Normal heart sounds. No murmur heard.  Pulmonary:      Effort: Pulmonary effort is normal. No respiratory distress.      Breath sounds: Normal breath sounds. No wheezing or rales.   Chest:      Chest wall: No tenderness.   Abdominal:      General: Bowel sounds are normal. There is distension.      Palpations: There is no mass.      Tenderness: There is no abdominal tenderness. There is no guarding.   Musculoskeletal:         General: No tenderness. Normal range of motion.      Cervical back: Normal range of motion and neck supple. No tenderness.      Right lower leg: No edema.      Left lower leg: No edema.   Skin:     General: Skin is dry.      Coloration: Skin is not jaundiced.      Findings: No " bruising, erythema or rash.   Neurological:      General: No focal deficit present.      Mental Status: He is alert and oriented to person, place, and time. Mental status is at baseline.      Cranial Nerves: No cranial nerve deficit.      Sensory: No sensory deficit.      Coordination: Coordination normal.      Gait: Gait normal.   Psychiatric:         Mood and Affect: Mood normal.         Thought Content: Thought content normal.         Judgment: Judgment normal.         Assessment/Plan   Problem List Items Addressed This Visit             ICD-10-CM    Erectile dysfunction N52.9    Relevant Medications    sildenafil (Viagra) 100 mg tablet    Mild asthma with acute exacerbation ([facility]) J45.901     Controlled/uneventful           Morbid obesity (Multi) E66.01    Type 2 diabetes mellitus with polyneuropathy E11.42    Relevant Medications    tirzepatide (Mounjaro) 15 mg/0.5 mL pen injector    Other Relevant Orders    POCT glycosylated hemoglobin (Hb A1C) manually resulted (Completed)    POCT fingerstick glucose manually resulted (Completed)    BMI 50.0-59.9, adult (Multi) Z68.43     Other Visit Diagnoses         Codes    Sore throat (viral)    -  Primary J02.8, B97.89    Relevant Medications    azithromycin (Zithromax) 250 mg tablet    Other Relevant Orders    POCT Influenza A/B manually resulted (Completed)    POCT Rapid Strep A manually resulted (Completed)          Provider Attestation - Scribe documentation    All medical record entries made by the Scribe were at my direction and personally dictated by me. I have reviewed the chart and agree that the record accurately reflects my personal performance of the history, physical exam, discussion and plan.

## 2025-03-14 NOTE — PATIENT INSTRUCTIONS
Follow up in 3 months    Continue current medications and therapy for chronic medical conditions.    Patient was advised importance of proper diet/nutrition in addition adequate hydration. Patient was encouraged moderate exercise program to include 30 minutes daily for 5 days of the week or 150 minutes weekly. Patient will follow-up with us as scheduled.    I personally reviewed the OARRS report for this patient. I have considered the risks of abuse, dependence, addiction, and diversion.    UDS/CSA:    In office Accu-Chek and hemoglobin A1c    Quick strep/negative    POCT for influenza A/B    Start Z-Boubacar x 1

## 2025-03-15 ASSESSMENT — ENCOUNTER SYMPTOMS
AGITATION: 0
RECTAL PAIN: 0
COLOR CHANGE: 0
CONSTIPATION: 0
BLOOD IN STOOL: 0
NECK STIFFNESS: 0
EYE PAIN: 0
DIZZINESS: 0
CHEST TIGHTNESS: 0
POLYDIPSIA: 0
MYALGIAS: 0
CONFUSION: 0
ABDOMINAL DISTENTION: 0
STRIDOR: 0
DECREASED CONCENTRATION: 0
FATIGUE: 0
ADENOPATHY: 0
SLEEP DISTURBANCE: 0
ACTIVITY CHANGE: 0
NERVOUS/ANXIOUS: 0
POLYPHAGIA: 0
PALPITATIONS: 0
APPETITE CHANGE: 0
PHOTOPHOBIA: 0
FLANK PAIN: 0
SINUS PRESSURE: 0
SEIZURES: 0
DYSURIA: 0
DYSPHORIC MOOD: 0
ABDOMINAL PAIN: 0
FEVER: 0
SINUS PAIN: 0
HEMATURIA: 0
CONSTITUTIONAL NEGATIVE: 1
RHINORRHEA: 0
SPEECH DIFFICULTY: 0
ARTHRALGIAS: 0

## 2025-03-28 DIAGNOSIS — E11.42 TYPE 2 DIABETES MELLITUS WITH POLYNEUROPATHY: ICD-10-CM

## 2025-03-28 PROCEDURE — RXMED WILLOW AMBULATORY MEDICATION CHARGE

## 2025-03-28 RX ORDER — TIRZEPATIDE 15 MG/.5ML
15 INJECTION, SOLUTION SUBCUTANEOUS WEEKLY
Qty: 2 ML | Refills: 3 | Status: SHIPPED | OUTPATIENT
Start: 2025-03-28

## 2025-03-29 ENCOUNTER — PHARMACY VISIT (OUTPATIENT)
Dept: PHARMACY | Facility: CLINIC | Age: 45
End: 2025-03-29
Payer: COMMERCIAL

## 2025-04-01 ENCOUNTER — APPOINTMENT (OUTPATIENT)
Dept: PRIMARY CARE | Facility: CLINIC | Age: 45
End: 2025-04-01
Payer: COMMERCIAL

## 2025-04-02 ENCOUNTER — APPOINTMENT (OUTPATIENT)
Dept: PRIMARY CARE | Facility: CLINIC | Age: 45
End: 2025-04-02
Payer: COMMERCIAL

## 2025-04-10 ENCOUNTER — APPOINTMENT (OUTPATIENT)
Dept: PRIMARY CARE | Facility: CLINIC | Age: 45
End: 2025-04-10
Payer: COMMERCIAL

## 2025-04-25 ENCOUNTER — APPOINTMENT (OUTPATIENT)
Dept: PHARMACY | Facility: HOSPITAL | Age: 45
End: 2025-04-25
Payer: COMMERCIAL

## 2025-04-25 DIAGNOSIS — E11.42 TYPE 2 DIABETES MELLITUS WITH POLYNEUROPATHY: ICD-10-CM

## 2025-04-25 PROCEDURE — RXMED WILLOW AMBULATORY MEDICATION CHARGE

## 2025-04-25 RX ORDER — TIRZEPATIDE 15 MG/.5ML
15 INJECTION, SOLUTION SUBCUTANEOUS WEEKLY
Qty: 2 ML | Refills: 3 | Status: SHIPPED | OUTPATIENT
Start: 2025-04-25

## 2025-04-25 NOTE — PROGRESS NOTES
Clinical Pharmacy Appointment    Patient ID: Taryn Ro is a 45 y.o. male who presents for Diabetes.    Pt is here for Follow Up appointment.     Referring Provider: Boo Kurtz DO  PCP: Boo Kurtz DO  Last visit with PCP: 3/14/2025   Next visit with PCP: 5/13/2025     Patient Assistance for Mounjaro and Jardiance approved through 11/13/2025. Will have to be renewed prior to that date to prevent lapse in coverage. Medication(s) will be received at no cost to patient from [facility].     Subjective   Medication Reconciliation:  No changes made today    Drug Interactions  No relevant drug interactions were noted.    Medication System Management  Patient's preferred pharmacy: [facility]  Adherence/Organization: No issues reported  Affordability/Accessibility:  patient assistance approved      HPI  DIABETES MELLITUS TYPE 2:    Diagnosed with diabetes:  >1 year old.   Known diabetic complications: Obesity.  Does patient follow with Endocrinology: No  Last optometry exam: Up to date  Most recent visit in Podiatry: N/a -- patient denies sores or cuts on feet today      Current diabetic medications include:  Mounjaro 15mg weekly  Jardiance 10mg daily    Clarifications to above regimen: N/a  Adverse Effects: N/a    Past diabetic medications include:  N/a    Glucose Readings:  Glucometer/CGM Type: Glucometer  Patient tests BG 1 times per week    Current home BG readings (mg/dL): 100-120    Any episodes of hypoglycemia? No,   .  Did patient treat episode of hypoglycemia appropriately? N/A  Does the patient have a prescription for ready-to-use Glucagon? Not on insulin    Lifestyle:  Working on eating smaller portions    Secondary Prevention:  Statin? Yes  ACE-I/ARB? No  Aspirin? No    Pertinent PMH Review:  PMH of Pancreatitis: No  PMH of Retinopathy: No  PMH of Urinary Tract Infections: No  PMH of MTC: No  PMH of MEN2: No  UACR/EGFR in last year?: No    Immunizations:  Influenza?  "No  COVID? Yes  Pneumonia? No  Shingles? No  Hepatitis B? No      Objective   Allergies[1]  Social History     Social History Narrative    Not on file      Medication Review  Current Outpatient Medications   Medication Instructions    atorvastatin (LIPITOR) 10 mg, oral, Daily    cyclobenzaprine (FLEXERIL) 10 mg, oral, 3 times daily PRN    empagliflozin (JARDIANCE) 10 mg, oral, Daily    Mounjaro 15 mg, subcutaneous, Weekly    sildenafil (VIAGRA) 100 mg, oral, Daily PRN      Vitals  BP Readings from Last 2 Encounters:   03/14/25 124/72   10/10/24 110/68     BMI Readings from Last 1 Encounters:   03/14/25 50.51 kg/m²      Labs  A1C  Lab Results   Component Value Date    HGBA1C 6.6 (A) 03/14/2025    HGBA1C 6.3 10/11/2024    HGBA1C 5.8 (H) 02/24/2024     BMP  Lab Results   Component Value Date    CALCIUM 9.2 02/24/2024     02/24/2024    K 4.6 02/24/2024    CO2 23 02/24/2024     02/24/2024    BUN 16 02/24/2024    CREATININE 0.93 02/24/2024    EGFR >90 02/24/2024     LFTs  Lab Results   Component Value Date    ALT 46 02/24/2024    AST 25 02/24/2024    ALKPHOS 87 02/24/2024    BILITOT 0.5 02/24/2024     FLP  Lab Results   Component Value Date    TRIG 510 (H) 02/24/2024    CHOL 172 02/24/2024    LDLF - 09/02/2023    LDLCALC  02/24/2024      Comment:      The calculation of LDL and VLDL are inaccurate when the Triglycerides are greater than 400 mg/dL or when the patient is non-fasting. If LDL measurement is necessary contact the testing laboratory for an alternative LDL assay.                                  Near   Borderline      AGE      Desirable  Optimal    High     High     Very High     0-19 Y     0 - 109     ---    110-129   >/= 130     ----    20-24 Y     0 - 119     ---    120-159   >/= 160     ----      >24 Y     0 -  99   100-129  130-159   160-189     >/=190      HDL 27.2 02/24/2024     Urine Microalbumin  No results found for: \"MICROALBCREA\"  Weight Management  Wt Readings from Last 3 Encounters: "   25 (!) 160 kg (352 lb)   10/10/24 (!) 162 kg (358 lb)   24 (!) 164 kg (361 lb)      There is no height or weight on file to calculate BMI.     Assessment/Plan   Problem List Items Addressed This Visit       Type 2 diabetes mellitus with polyneuropathy    Patient's goal A1c is < 7%.  Is pt at goal? Yes, 6.6% (3/14/2025)  Patient's SMBGs are in normal range, but patient states have slightly increased around the holidays.  Rationale for plan: Patient is tolerating current regimen, will continue with current medications. Patient is going to continue to work on diet/lifestyle.    Medication Changes:  CONTINUE  Mounjaro to 15mg under the skin once weekly. Will send to [facility] for mail order.  Jardiance 10mg daily. Will send to [facility] for mail order.    Future Considerations:  Continue titrating Mounjaro as needed    Diabetes Education  Rule of 15: eating ~15 g of carbs when BG less than 80 (half cup juice, 3-4 glucose tabs).  Recognize symptoms of high and low blood sugar.   Eat a realistic healthy diet consisting of fruits, vegetables, fiber, protein food choices on a regular basis and be aware of portion/serving sizes. Reduce carbohydrate consumption and always consume with protein and fat. Avoid foods high in saturated/trans fat, high salt content, and sweets and beverages with added sugars.  Limit alcohol consumption; alcohol may affect your blood sugar and cause hypoglycemia.   Stay active and incorporate ~30 mins of exercise into your daily routine to manage your weight and increase the body's acceptance of insulin.    PATIENT GOALS   Fasting B - 130 mg/dL 2-HR Postprandial BG:   Less than 180 mg/dL A1c:   Less than 7.0 %     Mounjaro Education:  Counseled patient on Mounjaro MOA, expectations, side effects, duration of therapy, administration, and monitoring parameters.  Counseled patient on the benefits of GLP-1ra, such as cardiovascular risk reduction, glycemic  control, and weight loss potential.  Provided detailed dosing and administration counseling to ensure proper technique.   Reviewed Mounjaro titration schedule, starting with 2.5 mg once weekly to 5 mg, 7.5mg, 10mg, 12.5mg and if tolerated 15 mg.  Reviewed storage requirements of Mounjaro when not in use, and when to administer the medication if a dose is missed.  Discussed risks of GLP1ra including risk of pancreatitis, MTC and worsening of DR  Advised patient that they may experience improved satiety after meals and portion sizes of meals may be reduced as doses of Mounjaro increase.    Empagliflozin (Jardiance) Education:  Counseled patient on Empagliflozin (Jardiance) MOA, expectations, side effects, duration of therapy, administration, and monitoring parameters.  Reviewed the benefits of SGLT-2i therapy, such as glycemic control and kidney and CV protection.  Advised patient to practice proper  hygiene to reduce risk of UTIs or yeast infections.  Advised patient to maintain adequate fluid intake to remain hydrated while on SGLT2i therapy.  Answered all patient questions and concerns.         Relevant Medications    tirzepatide (Mounjaro) 15 mg/0.5 mL pen injector    empagliflozin (Jardiance) 10 mg tablet       Clinical Pharmacist follow-up: 7/25/2025 at 8:20AM, Telehealth visit    Continue all meds under the continuation of care with the referring provider and clinical pharmacy team.    Thank you,  Maryann Bryant, PharmD   Clinical Pharmacist  [phone]     Verbal consent to manage patient's drug therapy was obtained from the patient. They were informed they may decline to participate or withdraw from participation in pharmacy services at any time.         [1] No Known Allergies

## 2025-04-25 NOTE — ASSESSMENT & PLAN NOTE
Patient's goal A1c is < 7%.  Is pt at goal? Yes, 6.6% (3/14/2025)  Patient's SMBGs are in normal range, but patient states have slightly increased around the holidays.  Rationale for plan: Patient is tolerating current regimen, will continue with current medications. Patient is going to continue to work on diet/lifestyle.    Medication Changes:  CONTINUE  Mounjaro to 15mg under the skin once weekly. Will send to [facility] for mail order.  Jardiance 10mg daily. Will send to [facility] for mail order.    Future Considerations:  Continue titrating Mounjaro as needed    Diabetes Education  Rule of 15: eating ~15 g of carbs when BG less than 80 (half cup juice, 3-4 glucose tabs).  Recognize symptoms of high and low blood sugar.   Eat a realistic healthy diet consisting of fruits, vegetables, fiber, protein food choices on a regular basis and be aware of portion/serving sizes. Reduce carbohydrate consumption and always consume with protein and fat. Avoid foods high in saturated/trans fat, high salt content, and sweets and beverages with added sugars.  Limit alcohol consumption; alcohol may affect your blood sugar and cause hypoglycemia.   Stay active and incorporate ~30 mins of exercise into your daily routine to manage your weight and increase the body's acceptance of insulin.    PATIENT GOALS   Fasting B - 130 mg/dL 2-HR Postprandial BG:   Less than 180 mg/dL A1c:   Less than 7.0 %     Mounjaro Education:  Counseled patient on Mounjaro MOA, expectations, side effects, duration of therapy, administration, and monitoring parameters.  Counseled patient on the benefits of GLP-1ra, such as cardiovascular risk reduction, glycemic control, and weight loss potential.  Provided detailed dosing and administration counseling to ensure proper technique.   Reviewed Mounjaro titration schedule, starting with 2.5 mg once weekly to 5 mg, 7.5mg, 10mg, 12.5mg and if tolerated 15 mg.  Reviewed storage  requirements of Mounjaro when not in use, and when to administer the medication if a dose is missed.  Discussed risks of GLP1ra including risk of pancreatitis, MTC and worsening of DR  Advised patient that they may experience improved satiety after meals and portion sizes of meals may be reduced as doses of Mounjaro increase.    Empagliflozin (Jardiance) Education:  Counseled patient on Empagliflozin (Jardiance) MOA, expectations, side effects, duration of therapy, administration, and monitoring parameters.  Reviewed the benefits of SGLT-2i therapy, such as glycemic control and kidney and CV protection.  Advised patient to practice proper  hygiene to reduce risk of UTIs or yeast infections.  Advised patient to maintain adequate fluid intake to remain hydrated while on SGLT2i therapy.  Answered all patient questions and concerns.

## 2025-04-29 ENCOUNTER — PHARMACY VISIT (OUTPATIENT)
Dept: PHARMACY | Facility: CLINIC | Age: 45
End: 2025-04-29
Payer: COMMERCIAL

## 2025-05-02 ENCOUNTER — APPOINTMENT (OUTPATIENT)
Dept: PHARMACY | Facility: HOSPITAL | Age: 45
End: 2025-05-02
Payer: COMMERCIAL

## 2025-05-13 ENCOUNTER — APPOINTMENT (OUTPATIENT)
Dept: PRIMARY CARE | Facility: CLINIC | Age: 45
End: 2025-05-13
Payer: COMMERCIAL

## 2025-06-13 PROCEDURE — RXMED WILLOW AMBULATORY MEDICATION CHARGE

## 2025-06-17 ENCOUNTER — PHARMACY VISIT (OUTPATIENT)
Dept: PHARMACY | Facility: CLINIC | Age: 45
End: 2025-06-17
Payer: COMMERCIAL

## 2025-06-19 ENCOUNTER — PHARMACY VISIT (OUTPATIENT)
Dept: PHARMACY | Facility: CLINIC | Age: 45
End: 2025-06-19
Payer: COMMERCIAL

## 2025-07-08 PROCEDURE — RXMED WILLOW AMBULATORY MEDICATION CHARGE

## 2025-07-14 ENCOUNTER — PHARMACY VISIT (OUTPATIENT)
Dept: PHARMACY | Facility: CLINIC | Age: 45
End: 2025-07-14
Payer: COMMERCIAL

## 2025-07-25 ENCOUNTER — APPOINTMENT (OUTPATIENT)
Dept: PHARMACY | Facility: HOSPITAL | Age: 45
End: 2025-07-25
Payer: COMMERCIAL

## 2025-07-25 DIAGNOSIS — E11.42 TYPE 2 DIABETES MELLITUS WITH POLYNEUROPATHY: Primary | ICD-10-CM

## 2025-07-25 NOTE — PROGRESS NOTES
Clinical Pharmacy Appointment    Patient ID: Taryn Ro is a 45 y.o. male who presents for Diabetes.    Pt is here for Follow Up appointment.     Referring Provider: Boo Kurtz DO  PCP: Boo Kurtz DO  Last visit with PCP: 3/14/2025   Next visit with PCP: not scheduled, encouraged     Patient Assistance for Mounjaro and Jardiance approved through 11/13/2025. Will have to be renewed prior to that date to prevent lapse in coverage. Medication(s) will be received at no cost to patient from [facility].     Subjective   Medication Reconciliation:  No changes made today    Drug Interactions  No relevant drug interactions were noted.    Medication System Management  Patient's preferred pharmacy: [facility]  Adherence/Organization: No issues reported  Affordability/Accessibility:  patient assistance approved      HPI  DIABETES MELLITUS TYPE 2:    Diagnosed with diabetes:  >1 year old.   Known diabetic complications: Obesity.  Does patient follow with Endocrinology: No  Last optometry exam: Up to date  Most recent visit in Podiatry: 5/16/2025 -- patient denies sores or cuts on feet today      Current diabetic medications include:  Mounjaro 15mg weekly  Jardiance 10mg daily    Clarifications to above regimen: N/a  Adverse Effects: N/a    Past diabetic medications include:  N/a    Glucose Readings:  Glucometer/CGM Type: Glucometer  Patient tests BG 1 times per week    Current home BG readings (mg/dL): 100-120    Any episodes of hypoglycemia? No,  .  Did patient treat episode of hypoglycemia appropriately? N/A  Does the patient have a prescription for ready-to-use Glucagon? Not on insulin    Lifestyle:  Working on eating smaller portions    Secondary Prevention:  Statin? Yes  ACE-I/ARB? No  Aspirin? No    Pertinent PMH Review:  PMH of Pancreatitis: No  PMH of Retinopathy: No  PMH of Urinary Tract Infections: No  PMH of MTC: No  PMH of MEN2: No  UACR/EGFR in last year?:  "No    Immunizations:  Influenza? No  COVID? Yes  Pneumonia? No  Shingles? No  Hepatitis B? No      Objective   Allergies[1]  Social History     Social History Narrative    Not on file      Medication Review  Current Outpatient Medications   Medication Instructions    atorvastatin (LIPITOR) 10 mg, oral, Daily    cyclobenzaprine (FLEXERIL) 10 mg, oral, 3 times daily PRN    Jardiance 10 mg, oral, Daily    Mounjaro 15 mg, subcutaneous, Weekly    sildenafil (VIAGRA) 100 mg, oral, Daily PRN      Vitals  BP Readings from Last 2 Encounters:   03/14/25 124/72   10/10/24 110/68     BMI Readings from Last 1 Encounters:   03/14/25 50.51 kg/m²      Labs  A1C  Lab Results   Component Value Date    HGBA1C 6.6 (A) 03/14/2025    HGBA1C 6.3 10/11/2024    HGBA1C 5.8 (H) 02/24/2024     BMP  Lab Results   Component Value Date    CALCIUM 9.2 02/24/2024     02/24/2024    K 4.6 02/24/2024    CO2 23 02/24/2024     02/24/2024    BUN 16 02/24/2024    CREATININE 0.93 02/24/2024    EGFR >90 02/24/2024     LFTs  Lab Results   Component Value Date    ALT 46 02/24/2024    AST 25 02/24/2024    ALKPHOS 87 02/24/2024    BILITOT 0.5 02/24/2024     FLP  Lab Results   Component Value Date    TRIG 510 (H) 02/24/2024    CHOL 172 02/24/2024    LDLF - 09/02/2023    LDLCALC  02/24/2024      Comment:      The calculation of LDL and VLDL are inaccurate when the Triglycerides are greater than 400 mg/dL or when the patient is non-fasting. If LDL measurement is necessary contact the testing laboratory for an alternative LDL assay.                                  Near   Borderline      AGE      Desirable  Optimal    High     High     Very High     0-19 Y     0 - 109     ---    110-129   >/= 130     ----    20-24 Y     0 - 119     ---    120-159   >/= 160     ----      >24 Y     0 -  99   100-129  130-159   160-189     >/=190      HDL 27.2 02/24/2024     Urine Microalbumin  No results found for: \"MICROALBCREA\"  Weight Management  Wt Readings from Last " 3 Encounters:   25 (!) 160 kg (352 lb)   10/10/24 (!) 162 kg (358 lb)   24 (!) 164 kg (361 lb)      There is no height or weight on file to calculate BMI.     Assessment/Plan   Problem List Items Addressed This Visit       Type 2 diabetes mellitus with polyneuropathy - Primary    Patient's goal A1c is < 7%.  Is pt at goal? Yes, 6.6% (3/14/2025)  Patient's SMBGs are in normal range, but patient states have slightly increased due to recent stressors in his life.  Rationale for plan: Patient is tolerating current regimen, will continue with current medications. Patient is going to continue to work on diet/lifestyle.    Medication Changes:  CONTINUE  Mounjaro to 15mg under the skin once weekly. Will send to [facility] for mail order.  Jardiance 10mg daily.     Future Considerations:  Continue titrating Mounjaro as needed    Diabetes Education  Rule of 15: eating ~15 g of carbs when BG less than 80 (half cup juice, 3-4 glucose tabs).  Recognize symptoms of high and low blood sugar.   Eat a realistic healthy diet consisting of fruits, vegetables, fiber, protein food choices on a regular basis and be aware of portion/serving sizes. Reduce carbohydrate consumption and always consume with protein and fat. Avoid foods high in saturated/trans fat, high salt content, and sweets and beverages with added sugars.  Limit alcohol consumption; alcohol may affect your blood sugar and cause hypoglycemia.   Stay active and incorporate ~30 mins of exercise into your daily routine to manage your weight and increase the body's acceptance of insulin.    PATIENT GOALS   Fasting B - 130 mg/dL 2-HR Postprandial BG:   Less than 180 mg/dL A1c:   Less than 7.0 %     Mounjaro Education:  Counseled patient on Mounjaro MOA, expectations, side effects, duration of therapy, administration, and monitoring parameters.  Counseled patient on the benefits of GLP-1ra, such as cardiovascular risk reduction, glycemic control, and  weight loss potential.  Provided detailed dosing and administration counseling to ensure proper technique.   Reviewed Mounjaro titration schedule, starting with 2.5 mg once weekly to 5 mg, 7.5mg, 10mg, 12.5mg and if tolerated 15 mg.  Reviewed storage requirements of Mounjaro when not in use, and when to administer the medication if a dose is missed.  Discussed risks of GLP1ra including risk of pancreatitis, MTC and worsening of DR  Advised patient that they may experience improved satiety after meals and portion sizes of meals may be reduced as doses of Mounjaro increase.    Empagliflozin (Jardiance) Education:  Counseled patient on Empagliflozin (Jardiance) MOA, expectations, side effects, duration of therapy, administration, and monitoring parameters.  Reviewed the benefits of SGLT-2i therapy, such as glycemic control and kidney and CV protection.  Advised patient to practice proper  hygiene to reduce risk of UTIs or yeast infections.  Advised patient to maintain adequate fluid intake to remain hydrated while on SGLT2i therapy.  Answered all patient questions and concerns.         Relevant Orders    Referral to Clinical Pharmacy     Clinical Pharmacist follow-up: 10/24/2025 at 8:20AM, Telehealth visit    Continue all meds under the continuation of care with the referring provider and clinical pharmacy team.    Thank you,  Maryann Bryant, PharmD   Clinical Pharmacist  [phone]     Verbal consent to manage patient's drug therapy was obtained from the patient. They were informed they may decline to participate or withdraw from participation in pharmacy services at any time.       [1] No Known Allergies

## 2025-07-25 NOTE — ASSESSMENT & PLAN NOTE
Patient's goal A1c is < 7%.  Is pt at goal? Yes, 6.6% (3/14/2025)  Patient's SMBGs are in normal range, but patient states have slightly increased due to recent stressors in his life.  Rationale for plan: Patient is tolerating current regimen, will continue with current medications. Patient is going to continue to work on diet/lifestyle.    Medication Changes:  CONTINUE  Mounjaro to 15mg under the skin once weekly. Will send to [facility] for mail order.  Jardiance 10mg daily.     Future Considerations:  Continue titrating Mounjaro as needed    Diabetes Education  Rule of 15: eating ~15 g of carbs when BG less than 80 (half cup juice, 3-4 glucose tabs).  Recognize symptoms of high and low blood sugar.   Eat a realistic healthy diet consisting of fruits, vegetables, fiber, protein food choices on a regular basis and be aware of portion/serving sizes. Reduce carbohydrate consumption and always consume with protein and fat. Avoid foods high in saturated/trans fat, high salt content, and sweets and beverages with added sugars.  Limit alcohol consumption; alcohol may affect your blood sugar and cause hypoglycemia.   Stay active and incorporate ~30 mins of exercise into your daily routine to manage your weight and increase the body's acceptance of insulin.    PATIENT GOALS   Fasting B - 130 mg/dL 2-HR Postprandial BG:   Less than 180 mg/dL A1c:   Less than 7.0 %     Mounjaro Education:  Counseled patient on Mounjaro MOA, expectations, side effects, duration of therapy, administration, and monitoring parameters.  Counseled patient on the benefits of GLP-1ra, such as cardiovascular risk reduction, glycemic control, and weight loss potential.  Provided detailed dosing and administration counseling to ensure proper technique.   Reviewed Mounjaro titration schedule, starting with 2.5 mg once weekly to 5 mg, 7.5mg, 10mg, 12.5mg and if tolerated 15 mg.  Reviewed storage requirements of Mounjaro when not in use,  and when to administer the medication if a dose is missed.  Discussed risks of GLP1ra including risk of pancreatitis, MTC and worsening of DR  Advised patient that they may experience improved satiety after meals and portion sizes of meals may be reduced as doses of Mounjaro increase.    Empagliflozin (Jardiance) Education:  Counseled patient on Empagliflozin (Jardiance) MOA, expectations, side effects, duration of therapy, administration, and monitoring parameters.  Reviewed the benefits of SGLT-2i therapy, such as glycemic control and kidney and CV protection.  Advised patient to practice proper  hygiene to reduce risk of UTIs or yeast infections.  Advised patient to maintain adequate fluid intake to remain hydrated while on SGLT2i therapy.  Answered all patient questions and concerns.

## 2025-08-05 PROCEDURE — RXMED WILLOW AMBULATORY MEDICATION CHARGE

## 2025-08-06 ENCOUNTER — PHARMACY VISIT (OUTPATIENT)
Dept: PHARMACY | Facility: CLINIC | Age: 45
End: 2025-08-06
Payer: COMMERCIAL

## 2025-09-02 DIAGNOSIS — E11.42 TYPE 2 DIABETES MELLITUS WITH POLYNEUROPATHY: ICD-10-CM

## 2025-09-02 PROCEDURE — RXMED WILLOW AMBULATORY MEDICATION CHARGE

## 2025-09-02 RX ORDER — TIRZEPATIDE 15 MG/.5ML
15 INJECTION, SOLUTION SUBCUTANEOUS WEEKLY
Qty: 2 ML | Refills: 1 | Status: SHIPPED | OUTPATIENT
Start: 2025-09-02

## 2025-09-04 ENCOUNTER — PHARMACY VISIT (OUTPATIENT)
Dept: PHARMACY | Facility: CLINIC | Age: 45
End: 2025-09-04
Payer: COMMERCIAL

## 2025-10-24 ENCOUNTER — APPOINTMENT (OUTPATIENT)
Dept: PHARMACY | Facility: HOSPITAL | Age: 45
End: 2025-10-24
Payer: COMMERCIAL